# Patient Record
Sex: MALE | Race: WHITE | NOT HISPANIC OR LATINO | Employment: FULL TIME | URBAN - METROPOLITAN AREA
[De-identification: names, ages, dates, MRNs, and addresses within clinical notes are randomized per-mention and may not be internally consistent; named-entity substitution may affect disease eponyms.]

---

## 2021-04-25 ENCOUNTER — HOSPITAL ENCOUNTER (OUTPATIENT)
Facility: HOSPITAL | Age: 41
Setting detail: OBSERVATION
Discharge: HOME | End: 2021-04-26
Attending: EMERGENCY MEDICINE | Admitting: STUDENT IN AN ORGANIZED HEALTH CARE EDUCATION/TRAINING PROGRAM

## 2021-04-25 ENCOUNTER — APPOINTMENT (OUTPATIENT)
Dept: GENERAL RADIOLOGY | Facility: HOSPITAL | Age: 41
End: 2021-04-25

## 2021-04-25 DIAGNOSIS — E10.10 DIABETIC KETOACIDOSIS WITHOUT COMA ASSOCIATED WITH TYPE 1 DIABETES MELLITUS (HCC): Primary | ICD-10-CM

## 2021-04-25 LAB
ALBUMIN SERPL-MCNC: 3.9 G/DL (ref 3.5–5.2)
ALBUMIN SERPL-MCNC: 4.7 G/DL (ref 3.5–5.2)
ALBUMIN/GLOB SERPL: 1.7 G/DL
ALBUMIN/GLOB SERPL: 1.7 G/DL
ALP SERPL-CCNC: 71 U/L (ref 39–117)
ALP SERPL-CCNC: 95 U/L (ref 39–117)
ALT SERPL W P-5'-P-CCNC: 16 U/L (ref 1–41)
ALT SERPL W P-5'-P-CCNC: 22 U/L (ref 1–41)
ANION GAP SERPL CALCULATED.3IONS-SCNC: 14 MMOL/L (ref 5–15)
ANION GAP SERPL CALCULATED.3IONS-SCNC: 17 MMOL/L (ref 5–15)
ANION GAP SERPL CALCULATED.3IONS-SCNC: 25 MMOL/L (ref 5–15)
ANISOCYTOSIS BLD QL: ABNORMAL
AST SERPL-CCNC: 26 U/L (ref 1–40)
AST SERPL-CCNC: 30 U/L (ref 1–40)
ATMOSPHERIC PRESS: ABNORMAL MM[HG]
BACTERIA UR QL AUTO: NORMAL /HPF
BASE EXCESS BLDV CALC-SCNC: -7.3 MMOL/L (ref -2–2)
BASOPHILS # BLD AUTO: 0.1 10*3/MM3 (ref 0–0.2)
BASOPHILS NFR BLD AUTO: 0.7 % (ref 0–1.5)
BDY SITE: ABNORMAL
BILIRUB SERPL-MCNC: 0.4 MG/DL (ref 0–1.2)
BILIRUB SERPL-MCNC: 0.4 MG/DL (ref 0–1.2)
BILIRUB UR QL STRIP: NEGATIVE
BUN SERPL-MCNC: 24 MG/DL (ref 6–20)
BUN SERPL-MCNC: 27 MG/DL (ref 6–20)
BUN SERPL-MCNC: 28 MG/DL (ref 6–20)
BUN/CREAT SERPL: 19.6 (ref 7–25)
BUN/CREAT SERPL: 21.4 (ref 7–25)
BUN/CREAT SERPL: 22.9 (ref 7–25)
CALCIUM SPEC-SCNC: 8.8 MG/DL (ref 8.6–10.5)
CALCIUM SPEC-SCNC: 9.2 MG/DL (ref 8.6–10.5)
CALCIUM SPEC-SCNC: 9.9 MG/DL (ref 8.6–10.5)
CHLORIDE SERPL-SCNC: 101 MMOL/L (ref 98–107)
CHLORIDE SERPL-SCNC: 95 MMOL/L (ref 98–107)
CHLORIDE SERPL-SCNC: 99 MMOL/L (ref 98–107)
CLARITY UR: CLEAR
CO2 BLDA-SCNC: 20.1 MMOL/L (ref 22–29)
CO2 SERPL-SCNC: 17 MMOL/L (ref 22–29)
CO2 SERPL-SCNC: 21 MMOL/L (ref 22–29)
CO2 SERPL-SCNC: 22 MMOL/L (ref 22–29)
COLOR UR: YELLOW
CREAT SERPL-MCNC: 1.12 MG/DL (ref 0.76–1.27)
CREAT SERPL-MCNC: 1.18 MG/DL (ref 0.76–1.27)
CREAT SERPL-MCNC: 1.43 MG/DL (ref 0.76–1.27)
DEPRECATED RDW RBC AUTO: 43.3 FL (ref 37–54)
DEPRECATED RDW RBC AUTO: 44.2 FL (ref 37–54)
EOSINOPHIL # BLD AUTO: 0 10*3/MM3 (ref 0–0.4)
EOSINOPHIL # BLD MANUAL: 0.19 10*3/MM3 (ref 0–0.4)
EOSINOPHIL NFR BLD AUTO: 0.2 % (ref 0.3–6.2)
EOSINOPHIL NFR BLD MANUAL: 1 % (ref 0.3–6.2)
ERYTHROCYTE [DISTWIDTH] IN BLOOD BY AUTOMATED COUNT: 13.2 % (ref 12.3–15.4)
ERYTHROCYTE [DISTWIDTH] IN BLOOD BY AUTOMATED COUNT: 13.4 % (ref 12.3–15.4)
ETHANOL UR QL: 0.02 %
GFR SERPL CREATININE-BSD FRML MDRD: 54 ML/MIN/1.73
GFR SERPL CREATININE-BSD FRML MDRD: 68 ML/MIN/1.73
GFR SERPL CREATININE-BSD FRML MDRD: 72 ML/MIN/1.73
GLOBULIN UR ELPH-MCNC: 2.3 GM/DL
GLOBULIN UR ELPH-MCNC: 2.7 GM/DL
GLUCOSE BLDC GLUCOMTR-MCNC: 151 MG/DL (ref 70–105)
GLUCOSE BLDC GLUCOMTR-MCNC: 158 MG/DL (ref 70–105)
GLUCOSE BLDC GLUCOMTR-MCNC: 163 MG/DL (ref 70–105)
GLUCOSE BLDC GLUCOMTR-MCNC: 270 MG/DL (ref 70–105)
GLUCOSE SERPL-MCNC: 146 MG/DL (ref 65–99)
GLUCOSE SERPL-MCNC: 226 MG/DL (ref 65–99)
GLUCOSE SERPL-MCNC: 290 MG/DL (ref 65–99)
GLUCOSE UR STRIP-MCNC: ABNORMAL MG/DL
HCO3 BLDV-SCNC: 18.9 MMOL/L (ref 22–26)
HCT VFR BLD AUTO: 41.1 % (ref 37.5–51)
HCT VFR BLD AUTO: 47.2 % (ref 37.5–51)
HGB BLD-MCNC: 14.3 G/DL (ref 13–17.7)
HGB BLD-MCNC: 16.1 G/DL (ref 13–17.7)
HGB UR QL STRIP.AUTO: ABNORMAL
HYALINE CASTS UR QL AUTO: NORMAL /LPF
INHALED O2 CONCENTRATION: 21 %
KETONES UR QL STRIP: ABNORMAL
KETONES UR QL STRIP: ABNORMAL
LEUKOCYTE ESTERASE UR QL STRIP.AUTO: NEGATIVE
LIPASE SERPL-CCNC: 17 U/L (ref 13–60)
LYMPHOCYTES # BLD AUTO: 2.3 10*3/MM3 (ref 0.7–3.1)
LYMPHOCYTES # BLD MANUAL: 2.23 10*3/MM3 (ref 0.7–3.1)
LYMPHOCYTES NFR BLD AUTO: 14.5 % (ref 19.6–45.3)
LYMPHOCYTES NFR BLD MANUAL: 12 % (ref 19.6–45.3)
LYMPHOCYTES NFR BLD MANUAL: 8 % (ref 5–12)
MAGNESIUM SERPL-MCNC: 1.6 MG/DL (ref 1.6–2.6)
MAGNESIUM SERPL-MCNC: 1.7 MG/DL (ref 1.6–2.6)
MCH RBC QN AUTO: 32.4 PG (ref 26.6–33)
MCH RBC QN AUTO: 32.9 PG (ref 26.6–33)
MCHC RBC AUTO-ENTMCNC: 34.1 G/DL (ref 31.5–35.7)
MCHC RBC AUTO-ENTMCNC: 34.7 G/DL (ref 31.5–35.7)
MCV RBC AUTO: 94.7 FL (ref 79–97)
MCV RBC AUTO: 95 FL (ref 79–97)
MODALITY: ABNORMAL
MONOCYTES # BLD AUTO: 0.8 10*3/MM3 (ref 0.1–0.9)
MONOCYTES # BLD AUTO: 1.49 10*3/MM3 (ref 0.1–0.9)
MONOCYTES NFR BLD AUTO: 5.1 % (ref 5–12)
NEUTROPHILS # BLD AUTO: 14.69 10*3/MM3 (ref 1.7–7)
NEUTROPHILS NFR BLD AUTO: 12.7 10*3/MM3 (ref 1.7–7)
NEUTROPHILS NFR BLD AUTO: 79.5 % (ref 42.7–76)
NEUTROPHILS NFR BLD MANUAL: 73 % (ref 42.7–76)
NEUTS BAND NFR BLD MANUAL: 6 % (ref 0–5)
NITRITE UR QL STRIP: NEGATIVE
NRBC BLD AUTO-RTO: 0 /100 WBC (ref 0–0.2)
OSMOLALITY SERPL: 296 MOSM/KG (ref 280–300)
OSMOLALITY SERPL: 300 MOSM/KG (ref 280–300)
PCO2 BLDV: 39.9 MM HG (ref 42–51)
PH BLDV: 7.28 PH UNITS (ref 7.32–7.43)
PH UR STRIP.AUTO: <=5 [PH] (ref 5–8)
PHOSPHATE SERPL-MCNC: 2.5 MG/DL (ref 2.5–4.5)
PHOSPHATE SERPL-MCNC: 4.5 MG/DL (ref 2.5–4.5)
PLAT MORPH BLD: NORMAL
PLATELET # BLD AUTO: 188 10*3/MM3 (ref 140–450)
PLATELET # BLD AUTO: 226 10*3/MM3 (ref 140–450)
PMV BLD AUTO: 8.2 FL (ref 6–12)
PMV BLD AUTO: 8.4 FL (ref 6–12)
PO2 BLDV: 32.2 MM HG (ref 40–42)
POTASSIUM SERPL-SCNC: 4.9 MMOL/L (ref 3.5–5.2)
POTASSIUM SERPL-SCNC: 5 MMOL/L (ref 3.5–5.2)
POTASSIUM SERPL-SCNC: 5.1 MMOL/L (ref 3.5–5.2)
PROT SERPL-MCNC: 6.2 G/DL (ref 6–8.5)
PROT SERPL-MCNC: 7.4 G/DL (ref 6–8.5)
PROT UR QL STRIP: NEGATIVE
RBC # BLD AUTO: 4.34 10*6/MM3 (ref 4.14–5.8)
RBC # BLD AUTO: 4.97 10*6/MM3 (ref 4.14–5.8)
RBC # UR: NORMAL /HPF
REF LAB TEST METHOD: NORMAL
SAO2 % BLDCOV: 54.7 % (ref 95–99)
SCAN SLIDE: NORMAL
SODIUM SERPL-SCNC: 137 MMOL/L (ref 136–145)
SP GR UR STRIP: 1.03 (ref 1–1.03)
SQUAMOUS #/AREA URNS HPF: NORMAL /HPF
TROPONIN T SERPL-MCNC: <0.01 NG/ML (ref 0–0.03)
UROBILINOGEN UR QL STRIP: ABNORMAL
WBC # BLD AUTO: 16 10*3/MM3 (ref 3.4–10.8)
WBC # BLD AUTO: 18.6 10*3/MM3 (ref 3.4–10.8)
WBC MORPH BLD: NORMAL
WBC UR QL AUTO: NORMAL /HPF

## 2021-04-25 PROCEDURE — 63710000001 INSULIN REGULAR HUMAN PER 5 UNITS: Performed by: EMERGENCY MEDICINE

## 2021-04-25 PROCEDURE — 80048 BASIC METABOLIC PNL TOTAL CA: CPT | Performed by: EMERGENCY MEDICINE

## 2021-04-25 PROCEDURE — 96375 TX/PRO/DX INJ NEW DRUG ADDON: CPT

## 2021-04-25 PROCEDURE — 84484 ASSAY OF TROPONIN QUANT: CPT | Performed by: HOSPITALIST

## 2021-04-25 PROCEDURE — 85025 COMPLETE CBC W/AUTO DIFF WBC: CPT | Performed by: HOSPITALIST

## 2021-04-25 PROCEDURE — 83735 ASSAY OF MAGNESIUM: CPT | Performed by: EMERGENCY MEDICINE

## 2021-04-25 PROCEDURE — 80053 COMPREHEN METABOLIC PANEL: CPT | Performed by: EMERGENCY MEDICINE

## 2021-04-25 PROCEDURE — 83930 ASSAY OF BLOOD OSMOLALITY: CPT | Performed by: HOSPITALIST

## 2021-04-25 PROCEDURE — 82077 ASSAY SPEC XCP UR&BREATH IA: CPT | Performed by: EMERGENCY MEDICINE

## 2021-04-25 PROCEDURE — 83690 ASSAY OF LIPASE: CPT | Performed by: EMERGENCY MEDICINE

## 2021-04-25 PROCEDURE — 83930 ASSAY OF BLOOD OSMOLALITY: CPT | Performed by: EMERGENCY MEDICINE

## 2021-04-25 PROCEDURE — 25010000002 ONDANSETRON PER 1 MG: Performed by: EMERGENCY MEDICINE

## 2021-04-25 PROCEDURE — 96366 THER/PROPH/DIAG IV INF ADDON: CPT

## 2021-04-25 PROCEDURE — 84100 ASSAY OF PHOSPHORUS: CPT | Performed by: EMERGENCY MEDICINE

## 2021-04-25 PROCEDURE — G0378 HOSPITAL OBSERVATION PER HR: HCPCS

## 2021-04-25 PROCEDURE — 85025 COMPLETE CBC W/AUTO DIFF WBC: CPT | Performed by: EMERGENCY MEDICINE

## 2021-04-25 PROCEDURE — U0004 COV-19 TEST NON-CDC HGH THRU: HCPCS | Performed by: EMERGENCY MEDICINE

## 2021-04-25 PROCEDURE — 71045 X-RAY EXAM CHEST 1 VIEW: CPT

## 2021-04-25 PROCEDURE — 96365 THER/PROPH/DIAG IV INF INIT: CPT

## 2021-04-25 PROCEDURE — 80053 COMPREHEN METABOLIC PANEL: CPT | Performed by: HOSPITALIST

## 2021-04-25 PROCEDURE — 82962 GLUCOSE BLOOD TEST: CPT

## 2021-04-25 PROCEDURE — C9803 HOPD COVID-19 SPEC COLLECT: HCPCS | Performed by: EMERGENCY MEDICINE

## 2021-04-25 PROCEDURE — 63710000001 INSULIN GLARGINE PER 5 UNITS: Performed by: INTERNAL MEDICINE

## 2021-04-25 PROCEDURE — 99284 EMERGENCY DEPT VISIT MOD MDM: CPT

## 2021-04-25 PROCEDURE — 83036 HEMOGLOBIN GLYCOSYLATED A1C: CPT | Performed by: HOSPITALIST

## 2021-04-25 PROCEDURE — 81001 URINALYSIS AUTO W/SCOPE: CPT | Performed by: EMERGENCY MEDICINE

## 2021-04-25 PROCEDURE — 99219 PR INITIAL OBSERVATION CARE/DAY 50 MINUTES: CPT | Performed by: HOSPITALIST

## 2021-04-25 PROCEDURE — 81003 URINALYSIS AUTO W/O SCOPE: CPT | Performed by: INTERNAL MEDICINE

## 2021-04-25 PROCEDURE — 82803 BLOOD GASES ANY COMBINATION: CPT

## 2021-04-25 PROCEDURE — 85007 BL SMEAR W/DIFF WBC COUNT: CPT | Performed by: EMERGENCY MEDICINE

## 2021-04-25 PROCEDURE — 63710000001 INSULIN LISPRO (HUMAN) PER 5 UNITS: Performed by: INTERNAL MEDICINE

## 2021-04-25 RX ORDER — SODIUM CHLORIDE 0.9 % (FLUSH) 0.9 %
3 SYRINGE (ML) INJECTION EVERY 12 HOURS SCHEDULED
Status: DISCONTINUED | OUTPATIENT
Start: 2021-04-25 | End: 2021-04-26 | Stop reason: HOSPADM

## 2021-04-25 RX ORDER — SODIUM CHLORIDE 9 MG/ML
250 INJECTION, SOLUTION INTRAVENOUS CONTINUOUS PRN
Status: DISCONTINUED | OUTPATIENT
Start: 2021-04-25 | End: 2021-04-25

## 2021-04-25 RX ORDER — INSULIN LISPRO 100 [IU]/ML
0-9 INJECTION, SOLUTION INTRAVENOUS; SUBCUTANEOUS AS NEEDED
Status: DISCONTINUED | OUTPATIENT
Start: 2021-04-25 | End: 2021-04-26 | Stop reason: HOSPADM

## 2021-04-25 RX ORDER — DEXTROSE AND SODIUM CHLORIDE 5; .45 G/100ML; G/100ML
150 INJECTION, SOLUTION INTRAVENOUS CONTINUOUS PRN
Status: DISCONTINUED | OUTPATIENT
Start: 2021-04-25 | End: 2021-04-25

## 2021-04-25 RX ORDER — POTASSIUM CHLORIDE, DEXTROSE MONOHYDRATE AND SODIUM CHLORIDE 300; 5; 900 MG/100ML; G/100ML; MG/100ML
150 INJECTION, SOLUTION INTRAVENOUS CONTINUOUS PRN
Status: DISCONTINUED | OUTPATIENT
Start: 2021-04-25 | End: 2021-04-25

## 2021-04-25 RX ORDER — INSULIN LISPRO 100 [IU]/ML
0-9 INJECTION, SOLUTION INTRAVENOUS; SUBCUTANEOUS
Status: DISCONTINUED | OUTPATIENT
Start: 2021-04-25 | End: 2021-04-26 | Stop reason: HOSPADM

## 2021-04-25 RX ORDER — SODIUM CHLORIDE 9 MG/ML
10 INJECTION, SOLUTION INTRAVENOUS CONTINUOUS PRN
Status: DISCONTINUED | OUTPATIENT
Start: 2021-04-25 | End: 2021-04-25

## 2021-04-25 RX ORDER — DEXTROSE AND SODIUM CHLORIDE 5; .9 G/100ML; G/100ML
150 INJECTION, SOLUTION INTRAVENOUS CONTINUOUS PRN
Status: DISCONTINUED | OUTPATIENT
Start: 2021-04-25 | End: 2021-04-26 | Stop reason: HOSPADM

## 2021-04-25 RX ORDER — DEXTROSE, SODIUM CHLORIDE, AND POTASSIUM CHLORIDE 5; .45; .15 G/100ML; G/100ML; G/100ML
150 INJECTION INTRAVENOUS CONTINUOUS PRN
Status: DISCONTINUED | OUTPATIENT
Start: 2021-04-25 | End: 2021-04-26 | Stop reason: HOSPADM

## 2021-04-25 RX ORDER — SODIUM CHLORIDE 0.9 % (FLUSH) 0.9 %
10 SYRINGE (ML) INJECTION ONCE AS NEEDED
Status: DISCONTINUED | OUTPATIENT
Start: 2021-04-25 | End: 2021-04-25

## 2021-04-25 RX ORDER — DEXTROSE AND SODIUM CHLORIDE 5; .45 G/100ML; G/100ML
150 INJECTION, SOLUTION INTRAVENOUS CONTINUOUS PRN
Status: DISCONTINUED | OUTPATIENT
Start: 2021-04-25 | End: 2021-04-26 | Stop reason: HOSPADM

## 2021-04-25 RX ORDER — SODIUM CHLORIDE 450 MG/100ML
250 INJECTION, SOLUTION INTRAVENOUS CONTINUOUS PRN
Status: DISCONTINUED | OUTPATIENT
Start: 2021-04-25 | End: 2021-04-26 | Stop reason: HOSPADM

## 2021-04-25 RX ORDER — SODIUM CHLORIDE 450 MG/100ML
250 INJECTION, SOLUTION INTRAVENOUS CONTINUOUS PRN
Status: DISCONTINUED | OUTPATIENT
Start: 2021-04-25 | End: 2021-04-25

## 2021-04-25 RX ORDER — ONDANSETRON 2 MG/ML
4 INJECTION INTRAMUSCULAR; INTRAVENOUS ONCE
Status: COMPLETED | OUTPATIENT
Start: 2021-04-25 | End: 2021-04-25

## 2021-04-25 RX ORDER — DEXTROSE MONOHYDRATE 25 G/50ML
12.5 INJECTION, SOLUTION INTRAVENOUS AS NEEDED
Status: DISCONTINUED | OUTPATIENT
Start: 2021-04-25 | End: 2021-04-26 | Stop reason: HOSPADM

## 2021-04-25 RX ORDER — SODIUM CHLORIDE 0.9 % (FLUSH) 0.9 %
10 SYRINGE (ML) INJECTION AS NEEDED
Status: DISCONTINUED | OUTPATIENT
Start: 2021-04-25 | End: 2021-04-25

## 2021-04-25 RX ORDER — DEXTROSE, SODIUM CHLORIDE, AND POTASSIUM CHLORIDE 5; .9; .15 G/100ML; G/100ML; G/100ML
150 INJECTION INTRAVENOUS CONTINUOUS PRN
Status: DISCONTINUED | OUTPATIENT
Start: 2021-04-25 | End: 2021-04-26 | Stop reason: HOSPADM

## 2021-04-25 RX ORDER — SODIUM CHLORIDE 9 MG/ML
10 INJECTION, SOLUTION INTRAVENOUS CONTINUOUS PRN
Status: DISCONTINUED | OUTPATIENT
Start: 2021-04-25 | End: 2021-04-26 | Stop reason: HOSPADM

## 2021-04-25 RX ORDER — NICOTINE POLACRILEX 4 MG
15 LOZENGE BUCCAL
Status: DISCONTINUED | OUTPATIENT
Start: 2021-04-25 | End: 2021-04-26 | Stop reason: HOSPADM

## 2021-04-25 RX ORDER — DEXTROSE, SODIUM CHLORIDE, AND POTASSIUM CHLORIDE 5; .45; .3 G/100ML; G/100ML; G/100ML
150 INJECTION INTRAVENOUS CONTINUOUS PRN
Status: DISCONTINUED | OUTPATIENT
Start: 2021-04-25 | End: 2021-04-25

## 2021-04-25 RX ORDER — SODIUM CHLORIDE AND POTASSIUM CHLORIDE 150; 900 MG/100ML; MG/100ML
250 INJECTION, SOLUTION INTRAVENOUS CONTINUOUS PRN
Status: DISCONTINUED | OUTPATIENT
Start: 2021-04-25 | End: 2021-04-26 | Stop reason: HOSPADM

## 2021-04-25 RX ORDER — POTASSIUM CHLORIDE, DEXTROSE MONOHYDRATE AND SODIUM CHLORIDE 300; 5; 900 MG/100ML; G/100ML; MG/100ML
150 INJECTION, SOLUTION INTRAVENOUS CONTINUOUS PRN
Status: DISCONTINUED | OUTPATIENT
Start: 2021-04-25 | End: 2021-04-26 | Stop reason: HOSPADM

## 2021-04-25 RX ORDER — INSULIN GLARGINE 100 [IU]/ML
20 INJECTION, SOLUTION SUBCUTANEOUS NIGHTLY
Status: DISCONTINUED | OUTPATIENT
Start: 2021-04-25 | End: 2021-04-26 | Stop reason: HOSPADM

## 2021-04-25 RX ORDER — SODIUM CHLORIDE AND POTASSIUM CHLORIDE 300; 900 MG/100ML; MG/100ML
250 INJECTION, SOLUTION INTRAVENOUS CONTINUOUS PRN
Status: DISCONTINUED | OUTPATIENT
Start: 2021-04-25 | End: 2021-04-25

## 2021-04-25 RX ORDER — DEXTROSE AND SODIUM CHLORIDE 5; .9 G/100ML; G/100ML
150 INJECTION, SOLUTION INTRAVENOUS CONTINUOUS PRN
Status: DISCONTINUED | OUTPATIENT
Start: 2021-04-25 | End: 2021-04-25

## 2021-04-25 RX ORDER — DEXTROSE, SODIUM CHLORIDE, AND POTASSIUM CHLORIDE 5; .9; .15 G/100ML; G/100ML; G/100ML
150 INJECTION INTRAVENOUS CONTINUOUS PRN
Status: DISCONTINUED | OUTPATIENT
Start: 2021-04-25 | End: 2021-04-25

## 2021-04-25 RX ORDER — DEXTROSE, SODIUM CHLORIDE, AND POTASSIUM CHLORIDE 5; .45; .15 G/100ML; G/100ML; G/100ML
150 INJECTION INTRAVENOUS CONTINUOUS PRN
Status: DISCONTINUED | OUTPATIENT
Start: 2021-04-25 | End: 2021-04-25

## 2021-04-25 RX ORDER — DEXTROSE, SODIUM CHLORIDE, AND POTASSIUM CHLORIDE 5; .45; .3 G/100ML; G/100ML; G/100ML
150 INJECTION INTRAVENOUS CONTINUOUS PRN
Status: DISCONTINUED | OUTPATIENT
Start: 2021-04-25 | End: 2021-04-26 | Stop reason: HOSPADM

## 2021-04-25 RX ORDER — INSULIN LISPRO 100 [IU]/ML
6 INJECTION, SOLUTION INTRAVENOUS; SUBCUTANEOUS
Status: DISCONTINUED | OUTPATIENT
Start: 2021-04-25 | End: 2021-04-26 | Stop reason: HOSPADM

## 2021-04-25 RX ORDER — SODIUM CHLORIDE AND POTASSIUM CHLORIDE 150; 450 MG/100ML; MG/100ML
250 INJECTION, SOLUTION INTRAVENOUS CONTINUOUS PRN
Status: DISCONTINUED | OUTPATIENT
Start: 2021-04-25 | End: 2021-04-25

## 2021-04-25 RX ORDER — SODIUM CHLORIDE 9 MG/ML
250 INJECTION, SOLUTION INTRAVENOUS CONTINUOUS PRN
Status: DISCONTINUED | OUTPATIENT
Start: 2021-04-25 | End: 2021-04-26 | Stop reason: HOSPADM

## 2021-04-25 RX ORDER — SODIUM CHLORIDE AND POTASSIUM CHLORIDE 150; 450 MG/100ML; MG/100ML
250 INJECTION, SOLUTION INTRAVENOUS CONTINUOUS PRN
Status: DISCONTINUED | OUTPATIENT
Start: 2021-04-25 | End: 2021-04-26 | Stop reason: HOSPADM

## 2021-04-25 RX ORDER — DEXTROSE MONOHYDRATE 25 G/50ML
12.5 INJECTION, SOLUTION INTRAVENOUS AS NEEDED
Status: DISCONTINUED | OUTPATIENT
Start: 2021-04-25 | End: 2021-04-25

## 2021-04-25 RX ORDER — SODIUM CHLORIDE 0.9 % (FLUSH) 0.9 %
10 SYRINGE (ML) INJECTION ONCE AS NEEDED
Status: DISCONTINUED | OUTPATIENT
Start: 2021-04-25 | End: 2021-04-26 | Stop reason: HOSPADM

## 2021-04-25 RX ORDER — SODIUM CHLORIDE 0.9 % (FLUSH) 0.9 %
10 SYRINGE (ML) INJECTION AS NEEDED
Status: DISCONTINUED | OUTPATIENT
Start: 2021-04-25 | End: 2021-04-26 | Stop reason: HOSPADM

## 2021-04-25 RX ORDER — DEXTROSE MONOHYDRATE 25 G/50ML
25 INJECTION, SOLUTION INTRAVENOUS
Status: DISCONTINUED | OUTPATIENT
Start: 2021-04-25 | End: 2021-04-26 | Stop reason: HOSPADM

## 2021-04-25 RX ORDER — SODIUM CHLORIDE AND POTASSIUM CHLORIDE 150; 900 MG/100ML; MG/100ML
250 INJECTION, SOLUTION INTRAVENOUS CONTINUOUS PRN
Status: DISCONTINUED | OUTPATIENT
Start: 2021-04-25 | End: 2021-04-25

## 2021-04-25 RX ADMIN — INSULIN GLARGINE 20 UNITS: 100 INJECTION, SOLUTION SUBCUTANEOUS at 19:20

## 2021-04-25 RX ADMIN — SODIUM CHLORIDE, POTASSIUM CHLORIDE, SODIUM LACTATE AND CALCIUM CHLORIDE 1000 ML: 600; 310; 30; 20 INJECTION, SOLUTION INTRAVENOUS at 14:17

## 2021-04-25 RX ADMIN — ONDANSETRON 4 MG: 2 INJECTION INTRAMUSCULAR; INTRAVENOUS at 14:16

## 2021-04-25 RX ADMIN — Medication 3 ML: at 21:39

## 2021-04-25 RX ADMIN — SODIUM CHLORIDE 4 UNITS/HR: 9 INJECTION, SOLUTION INTRAVENOUS at 16:38

## 2021-04-25 RX ADMIN — INSULIN LISPRO 6 UNITS: 100 INJECTION, SOLUTION INTRAVENOUS; SUBCUTANEOUS at 19:16

## 2021-04-25 RX ADMIN — SODIUM CHLORIDE 8 UNITS/HR: 9 INJECTION, SOLUTION INTRAVENOUS at 15:17

## 2021-04-25 RX ADMIN — FAMOTIDINE 20 MG: 10 INJECTION INTRAVENOUS at 14:17

## 2021-04-25 RX ADMIN — SODIUM CHLORIDE 2000 ML: 9 INJECTION, SOLUTION INTRAVENOUS at 15:18

## 2021-04-25 RX ADMIN — DEXTROSE MONOHYDRATE, SODIUM CHLORIDE, AND POTASSIUM CHLORIDE 150 ML/HR: 50; 4.5; 1.49 INJECTION, SOLUTION INTRAVENOUS at 17:17

## 2021-04-25 RX ADMIN — INSULIN LISPRO 6 UNITS: 100 INJECTION, SOLUTION INTRAVENOUS; SUBCUTANEOUS at 20:38

## 2021-04-26 VITALS
HEART RATE: 76 BPM | BODY MASS INDEX: 28.15 KG/M2 | WEIGHT: 190.04 LBS | DIASTOLIC BLOOD PRESSURE: 60 MMHG | TEMPERATURE: 98.2 F | HEIGHT: 69 IN | OXYGEN SATURATION: 97 % | SYSTOLIC BLOOD PRESSURE: 121 MMHG | RESPIRATION RATE: 20 BRPM

## 2021-04-26 LAB
ANION GAP SERPL CALCULATED.3IONS-SCNC: 11 MMOL/L (ref 5–15)
BUN SERPL-MCNC: 23 MG/DL (ref 6–20)
BUN/CREAT SERPL: 23 (ref 7–25)
CALCIUM SPEC-SCNC: 8.9 MG/DL (ref 8.6–10.5)
CHLORIDE SERPL-SCNC: 102 MMOL/L (ref 98–107)
CO2 SERPL-SCNC: 24 MMOL/L (ref 22–29)
CREAT SERPL-MCNC: 1 MG/DL (ref 0.76–1.27)
GFR SERPL CREATININE-BSD FRML MDRD: 82 ML/MIN/1.73
GLUCOSE BLDC GLUCOMTR-MCNC: 231 MG/DL (ref 70–105)
GLUCOSE BLDC GLUCOMTR-MCNC: 305 MG/DL (ref 70–105)
GLUCOSE SERPL-MCNC: 206 MG/DL (ref 65–99)
HBA1C MFR BLD: 9.4 % (ref 3.5–5.6)
MAGNESIUM SERPL-MCNC: 1.8 MG/DL (ref 1.6–2.6)
PHOSPHATE SERPL-MCNC: 2 MG/DL (ref 2.5–4.5)
POTASSIUM SERPL-SCNC: 4.6 MMOL/L (ref 3.5–5.2)
SARS-COV-2 ORF1AB RESP QL NAA+PROBE: NOT DETECTED
SODIUM SERPL-SCNC: 137 MMOL/L (ref 136–145)

## 2021-04-26 PROCEDURE — 63710000001 INSULIN LISPRO (HUMAN) PER 5 UNITS: Performed by: INTERNAL MEDICINE

## 2021-04-26 PROCEDURE — 99244 OFF/OP CNSLTJ NEW/EST MOD 40: CPT | Performed by: INTERNAL MEDICINE

## 2021-04-26 PROCEDURE — 80048 BASIC METABOLIC PNL TOTAL CA: CPT | Performed by: INTERNAL MEDICINE

## 2021-04-26 PROCEDURE — G0108 DIAB MANAGE TRN  PER INDIV: HCPCS

## 2021-04-26 PROCEDURE — 82962 GLUCOSE BLOOD TEST: CPT

## 2021-04-26 PROCEDURE — G0378 HOSPITAL OBSERVATION PER HR: HCPCS

## 2021-04-26 PROCEDURE — 83735 ASSAY OF MAGNESIUM: CPT | Performed by: INTERNAL MEDICINE

## 2021-04-26 PROCEDURE — 99217 PR OBSERVATION CARE DISCHARGE MANAGEMENT: CPT | Performed by: STUDENT IN AN ORGANIZED HEALTH CARE EDUCATION/TRAINING PROGRAM

## 2021-04-26 PROCEDURE — 84100 ASSAY OF PHOSPHORUS: CPT | Performed by: INTERNAL MEDICINE

## 2021-04-26 RX ORDER — CHLORPHENIR/PHENYLEPH/ASPIRIN 2-7.8-325
1 TABLET, EFFERVESCENT ORAL AS NEEDED
Qty: 50 STRIP | Refills: 2 | OUTPATIENT
Start: 2021-04-26

## 2021-04-26 RX ADMIN — INSULIN LISPRO 7 UNITS: 100 INJECTION, SOLUTION INTRAVENOUS; SUBCUTANEOUS at 12:06

## 2021-04-26 RX ADMIN — INSULIN LISPRO 4 UNITS: 100 INJECTION, SOLUTION INTRAVENOUS; SUBCUTANEOUS at 08:17

## 2021-04-26 RX ADMIN — INSULIN LISPRO 6 UNITS: 100 INJECTION, SOLUTION INTRAVENOUS; SUBCUTANEOUS at 12:06

## 2021-04-26 RX ADMIN — Medication 10 ML: at 08:17

## 2021-04-26 RX ADMIN — INSULIN LISPRO 6 UNITS: 100 INJECTION, SOLUTION INTRAVENOUS; SUBCUTANEOUS at 08:16

## 2021-04-27 ENCOUNTER — TELEPHONE (OUTPATIENT)
Dept: DIABETES SERVICES | Facility: HOSPITAL | Age: 41
End: 2021-04-27

## 2021-04-27 NOTE — TELEPHONE ENCOUNTER
Pt and wife wanted to be notified of A1c result since he was discharged before lab was resulted. Called and spoke with wife. Pt at work. Discussed current A1c result of 9.4%. Reviewed healthy A1c range. Wife with no additional questions/concerns.

## 2021-05-25 ENCOUNTER — OFFICE VISIT (OUTPATIENT)
Dept: ENDOCRINOLOGY | Facility: CLINIC | Age: 41
End: 2021-05-25

## 2021-05-25 VITALS
OXYGEN SATURATION: 95 % | HEART RATE: 78 BPM | TEMPERATURE: 97.3 F | BODY MASS INDEX: 27.25 KG/M2 | DIASTOLIC BLOOD PRESSURE: 70 MMHG | SYSTOLIC BLOOD PRESSURE: 105 MMHG | WEIGHT: 184 LBS | HEIGHT: 69 IN

## 2021-05-25 DIAGNOSIS — L84 FOOT CALLUS: ICD-10-CM

## 2021-05-25 DIAGNOSIS — IMO0002 DIABETES MELLITUS TYPE 1, UNCONTROLLED, WITH COMPLICATIONS: Primary | ICD-10-CM

## 2021-05-25 LAB — GLUCOSE BLDC GLUCOMTR-MCNC: 181 MG/DL (ref 70–105)

## 2021-05-25 PROCEDURE — 99214 OFFICE O/P EST MOD 30 MIN: CPT | Performed by: INTERNAL MEDICINE

## 2021-05-25 PROCEDURE — 82962 GLUCOSE BLOOD TEST: CPT | Performed by: INTERNAL MEDICINE

## 2021-05-25 RX ORDER — MAGNESIUM 200 MG
1000 TABLET ORAL DAILY
Qty: 100 EACH | Refills: 4 | Status: SHIPPED | OUTPATIENT
Start: 2021-05-25 | End: 2021-06-24

## 2021-05-25 RX ORDER — PROCHLORPERAZINE 25 MG/1
SUPPOSITORY RECTAL
Qty: 2 EACH | Refills: 6 | Status: SHIPPED | OUTPATIENT
Start: 2021-05-25 | End: 2022-02-01 | Stop reason: SDUPTHER

## 2021-05-25 RX ORDER — PROCHLORPERAZINE 25 MG/1
1 SUPPOSITORY RECTAL DAILY
Qty: 1 EACH | Refills: 0 | Status: SHIPPED | OUTPATIENT
Start: 2021-05-25 | End: 2022-02-01 | Stop reason: SDUPTHER

## 2021-05-25 RX ORDER — ERGOCALCIFEROL (VITAMIN D2) 50 MCG
2000 CAPSULE ORAL DAILY
Qty: 100 CAPSULE | Refills: 4 | Status: SHIPPED | OUTPATIENT
Start: 2021-05-25 | End: 2021-11-29 | Stop reason: SDUPTHER

## 2021-05-25 RX ORDER — PROCHLORPERAZINE 25 MG/1
1 SUPPOSITORY RECTAL DAILY
Qty: 1 EACH | Refills: 1 | Status: SHIPPED | OUTPATIENT
Start: 2021-05-25

## 2021-05-25 NOTE — PROGRESS NOTES
Endocrine Progress Note Outpatient     Patient Care Team:  Provider, No Known as PCP - General    Chief Complaint: Follow-up type 1 diabetes          HPI: 40-year-old male with history of type 1 diabetes for last 15 years was recently hospitalized with DKA due to pump failure, he is now here for follow-up.  He used to follow with Dr. Charly ALMAGUER but would like to switch the care.  He is currently using Medtronic 670 G pump.  His current insulin pump settings are as follows.  Basal rate: Midnight 0.80 units/h, 3 PM 0.45 units/h, 6 PM 1.25 units/h, and 10:30 PM 0.600 units/h.  His insulin carb ratio is 1 unit for each 10 g of carbohydrate.  His insulin correction factor is 1 unit for each 40 mg blood sugar with a target blood sugar of 100-1 30 and active insulin 3 hours.  He uses NovoLog insulin in the pump.  He has done diabetes education in the past and feels like he could use refresher course.    With regards to complication he does not have any diabetes complications per him.    Past Medical History:   Diagnosis Date   • Diabetes mellitus (CMS/[facility])     Type 1 (14 years)   • Diabetes mellitus type I (CMS/[facility])        Social History     Socioeconomic History   • Marital status:      Spouse name: Not on file   • Number of children: Not on file   • Years of education: Not on file   • Highest education level: Not on file   Tobacco Use   • Smoking status: Current Every Day Smoker     Packs/day: 1.00     Types: Cigarettes   • Smokeless tobacco: Never Used   Vaping Use   • Vaping Use: Never used   Substance and Sexual Activity   • Alcohol use: Yes     Alcohol/week: 2.0 standard drinks     Types: 1 Cans of beer, 1 Shots of liquor per week     Comment: Weekends 5-6 beers, tequilla, whiskey   • Drug use: Yes     Types: Marijuana     Comment: Daily   • Sexual activity: Yes     Partners: Female     Birth control/protection: None       History reviewed. No pertinent family history.    No Known Allergies    ROS:    Constitutional:  Denies fatigue, tiredness.    Eyes:  Denies change in visual acuity   HENT:  Denies nasal congestion or sore throat   Respiratory: denies cough, shortness of breath.   Cardiovascular:  denies chest pain, edema   GI:  Denies abdominal pain, nausea, vomiting.   Musculoskeletal:  Denies back pain or joint pain   Integument:  Denies dry skin and rash   Neurologic:  Denies headache, focal weakness or sensory changes   Endocrine:  Denies polyuria or polydipsia   Psychiatric:  Denies depression or anxiety      Vitals:    05/25/21 0926   BP: 105/70   Pulse: 78   Temp: 97.3 °F (36.3 °C)   SpO2: 95%     Body mass index is 27.17 kg/m².     Physical Exam:  GEN: NAD, conversant  EYES: EOMI, PERRL, no conjunctival erythema  NECK: no thyromegaly, full ROM   CV: RRR, no murmurs/rubs/gallops, no peripheral edema  LUNG: CTAB, no wheezes/rales/ronchi  SKIN: no rashes, no acanthosis  MSK: no deformities, full ROM of all extremities  NEURO: no tremors, DTR normal  PSYCH: AOX3, appropriate mood, affect normal  FEET: Has bilateral calluses on the feet.  Good monofilament test intact, good dorsal pedis pulses on both feet.  No ulcers seen at this time.    Results Review:     I reviewed the patient's new clinical results.    Lab Results   Component Value Date    HGBA1C 9.4 (H) 04/25/2021      Lab Results   Component Value Date    GLUCOSE 206 (H) 04/26/2021    BUN 23 (H) 04/26/2021    CREATININE 1.00 04/26/2021    EGFRIFNONA 82 04/26/2021    BCR 23.0 04/26/2021    K 4.6 04/26/2021    CO2 24.0 04/26/2021    CALCIUM 8.9 04/26/2021    ALBUMIN 3.90 04/25/2021    LABIL2 1.7 09/17/2019    AST 26 04/25/2021    ALT 16 04/25/2021    TRIG 106 09/17/2019    LDL 77 09/17/2019    HDL 58 09/17/2019     Lab Results   Component Value Date    TSH 2.250 09/17/2019    FREET4 1.16 09/17/2019         Medication Review: Reviewed.       Current Outpatient Medications:   •  insulin patient supplied pump, Inject  under the skin into the  appropriate area as directed Continuous. Novolog insulin- per patient ratio of 10:1, Disp: , Rfl:       Assessment/Plan   Diabetes mellitus type 1: Uncontrolled, I have reviewed his blood sugar records, they are unpredictable at this time.  At this time we will continue his current insulin pump settings and recommend that he uses Dexcom continuous glucose monitoring system.  I will send a prescription.  Always keep glucose source in case of low blood sugar.  Also will refer him for refresher course for diabetes education.  He will continue NovoLog insulin through the pump at this time.    2.  Bilateral calluses: We will send him for podiatry evaluation.            Skylar Barnes MD FACE.

## 2021-05-25 NOTE — PATIENT INSTRUCTIONS
Stop smoking.  Continue current insulin pump settings  Start Dexcom continuous glucose monitoring system  Arrange for refresher course for diabetes  Arrange for podiatry consultation with Dr. Jiang  Always keep glucose source in case of low blood sugar  Annual eye exam and flu vaccine  Labs before follow-up.

## 2021-05-26 ENCOUNTER — TELEPHONE (OUTPATIENT)
Dept: ENDOCRINOLOGY | Facility: CLINIC | Age: 41
End: 2021-05-26

## 2021-06-07 ENCOUNTER — TELEPHONE (OUTPATIENT)
Dept: ENDOCRINOLOGY | Facility: CLINIC | Age: 41
End: 2021-06-07

## 2021-06-07 NOTE — TELEPHONE ENCOUNTER
Called pt to clarify the CGM for [facility] ppw.  Pt is currently not on a CGM but wants to get the Dexcom.  MD order for pump supplies and CGM referral on MD desk for signature and then will be faxed to [facility].

## 2021-06-24 ENCOUNTER — OFFICE VISIT (OUTPATIENT)
Dept: PODIATRY | Facility: CLINIC | Age: 41
End: 2021-06-24

## 2021-06-24 VITALS
BODY MASS INDEX: 27.25 KG/M2 | HEART RATE: 76 BPM | SYSTOLIC BLOOD PRESSURE: 118 MMHG | WEIGHT: 184 LBS | DIASTOLIC BLOOD PRESSURE: 82 MMHG | HEIGHT: 69 IN

## 2021-06-24 DIAGNOSIS — L74.513 HYPERHIDROSIS OF FEET: ICD-10-CM

## 2021-06-24 DIAGNOSIS — L08.89 PITTED KERATOLYSIS: ICD-10-CM

## 2021-06-24 PROCEDURE — 99203 OFFICE O/P NEW LOW 30 MIN: CPT | Performed by: PODIATRIST

## 2021-06-24 RX ORDER — ACETAMINOPHEN 160 MG
TABLET,DISINTEGRATING ORAL
COMMUNITY
Start: 2021-05-25 | End: 2021-11-29 | Stop reason: SDUPTHER

## 2021-06-25 NOTE — PROGRESS NOTES
06/24/2021  Foot and Ankle Surgery - New Patient   Provider: Dr. Nura Jiang DPM  Location: [facility]    Subjective:  Billy Chaudhari is a 40 y.o. male.     Chief Complaint   Patient presents with   • Left Foot - Callouses   • Right Foot - Callouses   • Annual Exam     DM check       HPI: Patient presents as a routine diabetic foot check.  Patient does have issues involving his feet.  He complains of excessive sweating throughout the day that causes significant maceration and skin slough at the end of the day.  He states that he has had these issues for several years.  He has not seen a podiatrist in the past.  He denies any open wounds or infections to his feet.  He feels that his feet are doing quite well at this time but he has not had a full day of work.  He denies any significant itching but does complain of substantial foul odor.  He has tried over-the-counter antifungal topicals without any significant improvement.  Patient states that his most recent A1c was 8.9%.  No other issues today.    No Known Allergies    Past Medical History:   Diagnosis Date   • Diabetes mellitus (CMS/[facility])     Type 1 (14 years)   • Diabetes mellitus type I (CMS/[facility])        Past Surgical History:   Procedure Laterality Date   • HERNIA REPAIR         History reviewed. No pertinent family history.    Social History     Socioeconomic History   • Marital status:      Spouse name: Not on file   • Number of children: Not on file   • Years of education: Not on file   • Highest education level: Not on file   Tobacco Use   • Smoking status: Current Every Day Smoker     Packs/day: 1.00     Years: 22.00     Pack years: 22.00     Types: Cigarettes   • Smokeless tobacco: Never Used   Vaping Use   • Vaping Use: Never used   Substance and Sexual Activity   • Alcohol use: Yes     Alcohol/week: 2.0 standard drinks     Types: 1 Cans of beer, 1 Shots of liquor per week     Comment: Weekends 5-6 beers, tequilla, whiskey   • Drug  "use: Yes     Types: Marijuana     Comment: Daily   • Sexual activity: Yes     Partners: Female     Birth control/protection: None        Current Outpatient Medications on File Prior to Visit   Medication Sig Dispense Refill   • Continuous Blood Gluc  (Dexcom G6 ) device 1 Device Daily. 1 each 1   • Continuous Blood Gluc Sensor (Dexcom G6 Sensor) Every 10 (Ten) Days. 2 each 6   • Continuous Blood Gluc Transmit (Dexcom G6 Transmitter) misc 1 Device Daily. 1 each 0   • insulin patient supplied pump Inject  under the skin into the appropriate area as directed Continuous. Novolog insulin- per patient ratio of 10:1     • Vitamin D, Ergocalciferol, 50 MCG (2000 UT) capsule Take 2,000 Units by mouth Daily. 100 capsule 4   • Cholecalciferol (Vitamin D3) 50 MCG (2000 UT) capsule        No current facility-administered medications on file prior to visit.       Review of Systems:  General: Denies fever, chills, fatigue, and weakness.  Eyes: Denies vision loss, blurry vision, and excessive redness.  ENT: Denies hearing issues and difficulty swallowing.  Cardiovascular: Denies palpitations, chest pain, or syncopal episodes.  Respiratory: Denies shortness of breath, wheezing, and coughing.  GI: Denies abdominal pain, nausea, and vomiting.   : Denies frequency, hematuria, and urgency.  Musculoskeletal: Denies muscle cramps, joint pains, and stiffness.  Derm: + Excessive sweating   Neuro: Denies headaches, numbness, loss of coordination, and tremors.  Psych: Denies anxiety and depression.  Endocrine: Denies temperature intolerance and changes in appetite.  Heme: Denies bleeding disorders or abnormal bruising.     Objective   /82   Pulse 76   Ht 174 cm (68.5\")   Wt 83.5 kg (184 lb)   BMI 27.57 kg/m²     Foot/Ankle Exam:       General:   Appearance: appears stated age and healthy    Orientation: AAOx3    Affect: appropriate    Gait: unimpaired      VASCULAR      Right Foot Vascularity   Normal vascular exam "    Dorsalis pedis:  2+  Posterior tibial:  2+  Skin Temperature: warm    Edema Grading:  None  CFT:  < 3 seconds  Pedal Hair Growth:  Present  Varicosities: none       Left Foot Vascularity   Normal vascular exam    Dorsalis pedis:  2+  Posterior tibial:  2+  Skin Temperature: warm    Edema Grading:  None  CFT:  < 3 seconds  Pedal Hair Growth:  Present  Varicosities: none        NEUROLOGIC     Right Foot Neurologic   Achilles reflex:  2+     Left Foot Neurologic   Achilles reflex:  2+     MUSCULOSKELETAL      Right Foot Musculoskeletal   Ecchymosis:  None  Tenderness: none    Arch:  Normal     Left Foot Musculoskeletal   Ecchymosis:  None  Tenderness: none    Arch:  Normal     MUSCLE STRENGTH     Right Foot Muscle Strength   Normal strength    Foot dorsiflexion:  5  Foot plantar flexion:  5  Foot inversion:  5  Foot eversion:  5     Left Foot Muscle Strength   Normal strength    Foot dorsiflexion:  5  Foot plantar flexion:  5  Foot inversion:  5  Foot eversion:  5     DERMATOLOGIC     Right Foot Dermatologic   Skin: skin intact       Left Foot Dermatologic   Skin: skin intact       TESTS     Right Foot Tests   Anterior drawer: negative    Varus tilt: negative       Left Foot Tests   Anterior drawer: negative    Varus tilt: negative        Right Foot Additional Comments No open wounds or infections to the feet.  No substantial maceration or other concerning features at this time.      Assessment/Plan   Diagnoses and all orders for this visit:    1. Type 2 diabetes mellitus with hyperglycemia, without long-term current use of insulin (CMS/[facility]) (Primary)    2. Hyperhidrosis of feet    3. Pitted keratolysis      Patient presents for routine diabetic foot check but complains of excessive sweating and follow odor to the feet.  After evaluation, his feet appear appropriate today but he states that he has not been working.  At the end of the day he has his issues.  He has tried over-the-counter topical antifungals without  any significant improvement.  Today, he does not have any redness or irritation concerning for tinea.  I do feel that his issues are likely secondary to hyperhidrosis and pitted keratolysis.  I do not feel that he requires an oral antibiotic.  I have asked that he change his socks twice daily.  I would like him to consider an aerosol antiperspirant to his feet daily.  We did discuss these measures and observation.  He does notice intermittent tingling to his feet which could be secondary to lack of support and overuse.  I have asked that he obtain a pair of over-the-counter arch supports with metatarsal pad for forefoot offloading.  I also explained the possibility of diabetic neuropathy.  He does appear to be neurovascularly intact.  Overall, I do feel that he is at mild risk of pedal complications at this time.  After chart check, his most recent A1c was 9.4%.  We did discuss the importance of daily diabetic foot checks and glycemic control.  He is to return in 4 weeks for reevaluation    No orders of the defined types were placed in this encounter.       Note is dictated utilizing voice recognition software. Unfortunately this leads to occasional typographical errors. I apologize in advance if the situation occurs. If questions occur please do not hesitate to call our office.

## 2021-08-30 ENCOUNTER — OFFICE VISIT (OUTPATIENT)
Dept: ENDOCRINOLOGY | Facility: CLINIC | Age: 41
End: 2021-08-30

## 2021-08-30 DIAGNOSIS — IMO0002 DIABETES MELLITUS TYPE 1, UNCONTROLLED, WITH COMPLICATIONS: ICD-10-CM

## 2021-08-30 PROCEDURE — G0109 DIAB MANAGE TRN IND/GROUP: HCPCS | Performed by: DIETITIAN, REGISTERED

## 2021-08-30 NOTE — PROGRESS NOTES
Pt attended refresher class 1 for 1.5 hours. Pt had to leave class early. Called him after class and he is scheduled for refresher class 2 on 9/22.

## 2021-09-22 ENCOUNTER — OFFICE VISIT (OUTPATIENT)
Dept: ENDOCRINOLOGY | Facility: CLINIC | Age: 41
End: 2021-09-22

## 2021-09-22 DIAGNOSIS — IMO0002 DIABETES MELLITUS TYPE 1, UNCONTROLLED, WITH COMPLICATIONS: ICD-10-CM

## 2021-09-22 PROCEDURE — 97804 MEDICAL NUTRITION GROUP: CPT | Performed by: DIETITIAN, REGISTERED

## 2021-10-27 ENCOUNTER — TELEPHONE (OUTPATIENT)
Dept: ENDOCRINOLOGY | Facility: CLINIC | Age: 41
End: 2021-10-27

## 2021-10-27 NOTE — TELEPHONE ENCOUNTER
Pt called for a refill on Novolog but I not sure what it means when it says Novolog insulin per pat ratio could you please help me with this RX please send to [facility] 90 days supply.

## 2021-11-22 ENCOUNTER — LAB (OUTPATIENT)
Dept: LAB | Facility: HOSPITAL | Age: 41
End: 2021-11-22
Attending: INTERNAL MEDICINE

## 2021-11-22 DIAGNOSIS — IMO0002 DIABETES MELLITUS TYPE 1, UNCONTROLLED, WITH COMPLICATIONS: ICD-10-CM

## 2021-11-22 LAB
ALBUMIN SERPL-MCNC: 4.5 G/DL (ref 3.5–5.2)
ALBUMIN UR-MCNC: 4.1 MG/DL
ALBUMIN/GLOB SERPL: 1.8 G/DL
ALP SERPL-CCNC: 70 U/L (ref 39–117)
ALT SERPL W P-5'-P-CCNC: 17 U/L (ref 1–41)
ANION GAP SERPL CALCULATED.3IONS-SCNC: 7 MMOL/L (ref 5–15)
AST SERPL-CCNC: 19 U/L (ref 1–40)
BILIRUB SERPL-MCNC: 0.4 MG/DL (ref 0–1.2)
BUN SERPL-MCNC: 14 MG/DL (ref 6–20)
BUN/CREAT SERPL: 13.7 (ref 7–25)
CALCIUM SPEC-SCNC: 9.5 MG/DL (ref 8.6–10.5)
CHLORIDE SERPL-SCNC: 101 MMOL/L (ref 98–107)
CHOLEST SERPL-MCNC: 155 MG/DL (ref 0–200)
CO2 SERPL-SCNC: 30 MMOL/L (ref 22–29)
CREAT SERPL-MCNC: 1.02 MG/DL (ref 0.76–1.27)
CREAT UR-MCNC: 211 MG/DL
GFR SERPL CREATININE-BSD FRML MDRD: 80 ML/MIN/1.73
GLOBULIN UR ELPH-MCNC: 2.5 GM/DL
GLUCOSE SERPL-MCNC: 189 MG/DL (ref 65–99)
HBA1C MFR BLD: 8.3 % (ref 3.5–5.6)
HDLC SERPL-MCNC: 66 MG/DL (ref 40–60)
LDLC SERPL CALC-MCNC: 74 MG/DL (ref 0–100)
LDLC/HDLC SERPL: 1.12 {RATIO}
MICROALBUMIN/CREAT UR: 19.4 MG/G
POTASSIUM SERPL-SCNC: 4.7 MMOL/L (ref 3.5–5.2)
PROT SERPL-MCNC: 7 G/DL (ref 6–8.5)
SODIUM SERPL-SCNC: 138 MMOL/L (ref 136–145)
TRIGL SERPL-MCNC: 76 MG/DL (ref 0–150)
VLDLC SERPL-MCNC: 15 MG/DL (ref 5–40)

## 2021-11-22 PROCEDURE — 80061 LIPID PANEL: CPT

## 2021-11-22 PROCEDURE — 83036 HEMOGLOBIN GLYCOSYLATED A1C: CPT

## 2021-11-22 PROCEDURE — 82570 ASSAY OF URINE CREATININE: CPT

## 2021-11-22 PROCEDURE — 36415 COLL VENOUS BLD VENIPUNCTURE: CPT

## 2021-11-22 PROCEDURE — 80053 COMPREHEN METABOLIC PANEL: CPT

## 2021-11-22 PROCEDURE — 82043 UR ALBUMIN QUANTITATIVE: CPT

## 2021-11-29 ENCOUNTER — OFFICE VISIT (OUTPATIENT)
Dept: ENDOCRINOLOGY | Facility: CLINIC | Age: 41
End: 2021-11-29

## 2021-11-29 VITALS
OXYGEN SATURATION: 96 % | HEIGHT: 69 IN | SYSTOLIC BLOOD PRESSURE: 112 MMHG | TEMPERATURE: 97.3 F | HEART RATE: 84 BPM | BODY MASS INDEX: 28.44 KG/M2 | DIASTOLIC BLOOD PRESSURE: 70 MMHG | WEIGHT: 192 LBS

## 2021-11-29 DIAGNOSIS — E10.65 TYPE 1 DIABETES MELLITUS WITH HYPERGLYCEMIA (HCC): Primary | ICD-10-CM

## 2021-11-29 DIAGNOSIS — E55.9 VITAMIN D DEFICIENCY: ICD-10-CM

## 2021-11-29 PROCEDURE — 99214 OFFICE O/P EST MOD 30 MIN: CPT | Performed by: INTERNAL MEDICINE

## 2021-11-29 RX ORDER — ACETAMINOPHEN 160 MG
TABLET,DISINTEGRATING ORAL
Qty: 100 CAPSULE | Refills: 4 | Status: SHIPPED | OUTPATIENT
Start: 2021-11-29 | End: 2022-06-14

## 2021-11-29 NOTE — PROGRESS NOTES
Endocrine Progress Note Outpatient     Patient Care Team:  Provider, No Known as PCP - General    Chief Complaint: Follow-up type 1 diabetes    HPI: 41-year-old male with history of type 1 diabetes for last 15 years is here for follow-up.    He is currently using Medtronic 670 G pump.  His current insulin pump settings are as follows.  Basal rate: Midnight 0.80 units/h, 3 PM 0.45 units/h, 6 PM 1.25 units/h, and 10:30 PM 0.600 units/h.  His insulin carb ratio is 1 unit for each 10 g of carbohydrate.  His insulin correction factor is 1 unit for each 40 mg blood sugar with a target blood sugar of 100-1 30 and active insulin 3 hours.  He uses NovoLog insulin in the pump.  He has done diabetes education in the past.  He also done refresher course.  He is also now using Dexcom continuous glucose monitoring system.  He feels like it has helped him.  Unfortunately were not able to download that today.  He tells me that his blood sugars runs high in the morning between 6-9 AM.  He has not been hospitalized since last seen for either low or high blood sugar and has not required any assistance or hospitalization.    With regards to complication he does not have any diabetes complications per him.    Past Medical History:   Diagnosis Date   • Diabetes mellitus (HCC)     Type 1 (14 years)   • Diabetes mellitus type I (HCC)        Social History     Socioeconomic History   • Marital status:    Tobacco Use   • Smoking status: Current Every Day Smoker     Packs/day: 1.00     Years: 22.00     Pack years: 22.00     Types: Cigarettes   • Smokeless tobacco: Never Used   Vaping Use   • Vaping Use: Never used   Substance and Sexual Activity   • Alcohol use: Yes     Alcohol/week: 2.0 standard drinks     Types: 1 Cans of beer, 1 Shots of liquor per week     Comment: Weekends 5-6 beers, tequilla, whiskey   • Drug use: Yes     Types: Marijuana     Comment: Daily   • Sexual activity: Yes     Partners: Female     Birth  control/protection: None       Family History   Problem Relation Age of Onset   • Diabetes Son         Type 1        No Known Allergies    ROS:   Constitutional:  Denies fatigue, tiredness.    Eyes:  Denies change in visual acuity   HENT:  Denies nasal congestion or sore throat   Respiratory: denies cough, shortness of breath.   Cardiovascular:  denies chest pain, edema   GI:  Denies abdominal pain, nausea, vomiting.   Musculoskeletal:  Denies back pain or joint pain   Integument:  Denies dry skin and rash   Neurologic:  Denies headache, focal weakness or sensory changes   Endocrine:  Denies polyuria or polydipsia   Psychiatric:  Denies depression or anxiety      Vitals:    11/29/21 1545   BP: 112/70   Pulse: 84   Temp: 97.3 °F (36.3 °C)   SpO2: 96%     Body mass index is 28.77 kg/m².     Physical Exam:  GEN: NAD, conversant  EYES: EOMI, PERRL, no conjunctival erythema  NECK: no thyromegaly, full ROM   CV: RRR, no murmurs/rubs/gallops, no peripheral edema  LUNG: CTAB, no wheezes/rales/ronchi  SKIN: no rashes, no acanthosis  MSK: no deformities, full ROM of all extremities  NEURO: no tremors, DTR normal  PSYCH: AOX3, appropriate mood, affect normal  FEET: Has bilateral calluses on the feet.  Good monofilament test intact, good dorsal pedis pulses on both feet.  No ulcers seen at this time.    Results Review:     I reviewed the patient's new clinical results.    Lab Results   Component Value Date    HGBA1C 8.3 (H) 11/22/2021    HGBA1C 9.4 (H) 04/25/2021      Lab Results   Component Value Date    GLUCOSE 189 (H) 11/22/2021    BUN 14 11/22/2021    CREATININE 1.02 11/22/2021    EGFRIFNONA 80 11/22/2021    BCR 13.7 11/22/2021    K 4.7 11/22/2021    CO2 30.0 (H) 11/22/2021    CALCIUM 9.5 11/22/2021    ALBUMIN 4.50 11/22/2021    LABIL2 1.7 09/17/2019    AST 19 11/22/2021    ALT 17 11/22/2021    CHOL 155 11/22/2021    TRIG 76 11/22/2021    LDL 74 11/22/2021    HDL 66 (H) 11/22/2021     Lab Results   Component Value Date     TSH 2.250 09/17/2019    FREET4 1.16 09/17/2019         Medication Review: Reviewed.       Current Outpatient Medications:   •  Cholecalciferol (Vitamin D3) 50 MCG (2000 UT) capsule, , Disp: , Rfl:   •  Continuous Blood Gluc  (Dexcom G6 ) device, 1 Device Daily., Disp: 1 each, Rfl: 1  •  Continuous Blood Gluc Sensor (Dexcom G6 Sensor), Every 10 (Ten) Days., Disp: 2 each, Rfl: 6  •  Continuous Blood Gluc Transmit (Dexcom G6 Transmitter) misc, 1 Device Daily., Disp: 1 each, Rfl: 0  •  insulin aspart (novoLOG) 100 UNIT/ML injection, Use in insulin pump as directed. MDD 50 units, Disp: 50 mL, Rfl: 1  •  insulin patient supplied pump, Inject  under the skin into the appropriate area as directed Continuous. Novolog insulin- per patient ratio of 10:1, Disp: , Rfl:   •  lidocaine (XYLOCAINE) 5 % ointment, , Disp: , Rfl:       Assessment/Plan   Diabetes mellitus type 1: Uncontrolled, but improving with A1c now at 8.3%.  Unfortunately no blood sugar records for review available at this time.  He is going to find his Dexcom log and will share the data with his and then will make further recommendations.  For now we will continue his current insulin pump settings.  He is also due for pump upgrade, I will refer him for pump upgrade to tandem T slim insulin pump.  Always keep glucose source in case of low blood sugar.  Also will refer him for refresher course for diabetes education.  He will continue NovoLog insulin through the pump at this time.    2.  Bilateral calluses: He did follow with Podiatry.    3.  Vitamin D deficiency: On vitamin D supplementation.            Skylar Barnes MD FACE.

## 2021-11-29 NOTE — PATIENT INSTRUCTIONS
Please stop smoking  Please resume vitamin D at 2000 units p.o. daily  Please arrange appointment with diabetes educators for pump upgrade  Please log into your Dexcom account and share your data with us  Always keep glucose source in case of low blood sugar  Annual eye exam and flu vaccine  Labs before follow-up.

## 2021-12-01 ENCOUNTER — TELEPHONE (OUTPATIENT)
Dept: ENDOCRINOLOGY | Facility: CLINIC | Age: 41
End: 2021-12-01

## 2021-12-01 NOTE — TELEPHONE ENCOUNTER
Called pt about pump upgrade.  He states his pump is OOW and wants to get the tslim.  Emailed AOB to pt to fill out, sign and fax back.    Once receiving it back will fax to [employer] with last OV, demo sheet and insurance cards.

## 2021-12-03 ENCOUNTER — TELEPHONE (OUTPATIENT)
Dept: ENDOCRINOLOGY | Facility: CLINIC | Age: 41
End: 2021-12-03

## 2021-12-06 ENCOUNTER — TELEPHONE (OUTPATIENT)
Dept: ENDOCRINOLOGY | Facility: CLINIC | Age: 41
End: 2021-12-06

## 2021-12-12 ENCOUNTER — HOSPITAL ENCOUNTER (EMERGENCY)
Facility: HOSPITAL | Age: 41
Discharge: HOME | End: 2021-12-12
Admitting: EMERGENCY MEDICINE

## 2021-12-12 VITALS
SYSTOLIC BLOOD PRESSURE: 135 MMHG | TEMPERATURE: 98 F | HEIGHT: 68 IN | WEIGHT: 185.19 LBS | BODY MASS INDEX: 28.07 KG/M2 | RESPIRATION RATE: 16 BRPM | DIASTOLIC BLOOD PRESSURE: 80 MMHG | OXYGEN SATURATION: 100 % | HEART RATE: 76 BPM

## 2021-12-12 DIAGNOSIS — S16.1XXA STRAIN OF NECK MUSCLE, INITIAL ENCOUNTER: Primary | ICD-10-CM

## 2021-12-12 DIAGNOSIS — M25.511 ACUTE PAIN OF RIGHT SHOULDER: ICD-10-CM

## 2021-12-12 PROCEDURE — 99282 EMERGENCY DEPT VISIT SF MDM: CPT

## 2021-12-12 PROCEDURE — 25010000002 METHYLPREDNISOLONE PER 125 MG: Performed by: PHYSICIAN ASSISTANT

## 2021-12-12 PROCEDURE — 25010000002 KETOROLAC TROMETHAMINE PER 15 MG: Performed by: PHYSICIAN ASSISTANT

## 2021-12-12 PROCEDURE — 96372 THER/PROPH/DIAG INJ SC/IM: CPT

## 2021-12-12 RX ORDER — METHYLPREDNISOLONE SODIUM SUCCINATE 125 MG/2ML
125 INJECTION, POWDER, LYOPHILIZED, FOR SOLUTION INTRAMUSCULAR; INTRAVENOUS ONCE
Status: COMPLETED | OUTPATIENT
Start: 2021-12-12 | End: 2021-12-12

## 2021-12-12 RX ORDER — LIDOCAINE 50 MG/G
1 PATCH TOPICAL EVERY 24 HOURS
Qty: 20 PATCH | Refills: 0 | Status: SHIPPED | OUTPATIENT
Start: 2021-12-12 | End: 2022-06-14

## 2021-12-12 RX ORDER — METHYLPREDNISOLONE 4 MG/1
TABLET ORAL
Qty: 21 TABLET | Refills: 0 | Status: SHIPPED | OUTPATIENT
Start: 2021-12-12 | End: 2022-06-14

## 2021-12-12 RX ORDER — KETOROLAC TROMETHAMINE 30 MG/ML
30 INJECTION, SOLUTION INTRAMUSCULAR; INTRAVENOUS ONCE
Status: COMPLETED | OUTPATIENT
Start: 2021-12-12 | End: 2021-12-12

## 2021-12-12 RX ORDER — METHOCARBAMOL 500 MG/1
500 TABLET, FILM COATED ORAL 4 TIMES DAILY
Qty: 30 TABLET | Refills: 0 | Status: SHIPPED | OUTPATIENT
Start: 2021-12-12 | End: 2022-06-14

## 2021-12-12 RX ORDER — DICLOFENAC SODIUM 75 MG/1
75 TABLET, DELAYED RELEASE ORAL 2 TIMES DAILY
Qty: 60 TABLET | Refills: 0 | Status: SHIPPED | OUTPATIENT
Start: 2021-12-12 | End: 2022-06-14

## 2021-12-12 RX ADMIN — KETOROLAC TROMETHAMINE 30 MG: 30 INJECTION, SOLUTION INTRAMUSCULAR at 10:17

## 2021-12-12 RX ADMIN — METHYLPREDNISOLONE SODIUM SUCCINATE 125 MG: 125 INJECTION, POWDER, FOR SOLUTION INTRAMUSCULAR; INTRAVENOUS at 10:18

## 2021-12-12 NOTE — DISCHARGE INSTRUCTIONS
"** ANY CONDITION CAN CHANGE, despite proper treatment.    ** IF YOUR SYMPTOMS WORSEN, CHANGE, or PERSIST,   then get to the nearest Emergency Department (ER), call your PCP, or return to Urgent Care.    ** FOLLOW-UP CARE IS YOUR RESPONSIBILITY.    Discharge from Urgent Care today does NOT mean that nothing is wrong, but it indicates no emergency is identified.  Urgent Care is NOT a substitute for your primary care provider/physician (PCP), additional evaluation may be needed.     ** ALWAYS FOLLOW-UP WITH YOUR OWN PHYSICIAN / PRIMARY CARE PROVIDER (PCP)  to review this Urgent Care visit, and for review and possible repeat blood or urine tests, x-rays, or other diagnostics.      ** COMPLETE ALL TESTS & SCHEDULE ALL REFERRALS ordered or recommended by Urgent Care.  If you need help making an appointment within the recommended time frame, then please call us for help!  For assistance arranging a PCP appointment, call [phone] option 2.  [facility] may call to   assist with some referrals.    ** CONTACT URGENT CARE for ALL TEST, X-RAY, and other RESULTS within 3 DAYS.    Please do NOT assume that \"no news is good news.\"    ** ALL MEDICATIONS CAN HAVE SIDE EFFECTS & INTERACTIONS.  Please review these, and ask your pharmacist or contact your primary care provider for questions or concerns.   Be sure that UC, your pharmacist, and your PCP have a complete list of all medications and supplements you're taking.      "

## 2021-12-12 NOTE — ED PROVIDER NOTES
Subjective   History:  Patient is a 41-year-old male who presents with right-sided neck and shoulder pain.  He reports several years ago he had an injury and since then he has flareups.  He reports it happened on Wednesday morning started having muscle spasms that has come down but he reports he still has sharp pain on the right side of his shoulder and neck.  Has been taking ibuprofen without significant change or relief.    Onset: 4 days  Location: Right side of neck and shoulder  Duration: Constant  Character: Sharp pain  Aggravating/Alleviating factors: None  Radiation right arm moderate  Severity:             Review of Systems   Constitutional: Negative for chills, diaphoresis, fatigue and fever.   Respiratory: Negative for cough, choking, shortness of breath and stridor.    Gastrointestinal: Negative for abdominal distention, abdominal pain, nausea and vomiting.   Genitourinary: Negative.    Musculoskeletal: Positive for neck pain.   Skin: Negative.    Neurological: Negative.    Psychiatric/Behavioral: Negative.        Past Medical History:   Diagnosis Date   • Diabetes mellitus (HCC)     Type 1 (14 years)   • Diabetes mellitus type I (HCC)        No Known Allergies    Past Surgical History:   Procedure Laterality Date   • HERNIA REPAIR         Family History   Problem Relation Age of Onset   • Diabetes Son         Type 1        Social History     Socioeconomic History   • Marital status:    Tobacco Use   • Smoking status: Current Every Day Smoker     Packs/day: 1.00     Years: 22.00     Pack years: 22.00     Types: Cigarettes   • Smokeless tobacco: Never Used   Vaping Use   • Vaping Use: Never used   Substance and Sexual Activity   • Alcohol use: Yes     Alcohol/week: 2.0 standard drinks     Types: 1 Cans of beer, 1 Shots of liquor per week     Comment: Weekends 5-6 beers, tequilla, whiskey   • Drug use: Yes     Types: Marijuana     Comment: Daily   • Sexual activity: Yes     Partners: Female     Birth  control/protection: None           Objective   Physical Exam  Vitals and nursing note reviewed.   Constitutional:       Appearance: He is well-developed.   HENT:      Head: Normocephalic and atraumatic.      Nose: Nose normal.   Eyes:      Pupils: Pupils are equal, round, and reactive to light.   Pulmonary:      Effort: Pulmonary effort is normal.   Musculoskeletal:         General: Normal range of motion.      Cervical back: Normal range of motion.      Comments: No pain with palpation over cervical spinal musculature.  Full ROM of neck intact.  Full ROM of right shoulder.  No loss in strength in upper extremity.  No loss of sensation bilaterally in upper extremity   Skin:     General: Skin is warm and dry.   Neurological:      General: No focal deficit present.      Mental Status: He is alert and oriented to person, place, and time.   Psychiatric:         Mood and Affect: Mood normal.         Behavior: Behavior normal.         Thought Content: Thought content normal.         Judgment: Judgment normal.         Procedures           ED Course                                                 MDM  Number of Diagnoses or Management Options  Acute pain of right shoulder  Strain of neck muscle, initial encounter  Diagnosis management comments: I examined the patient using the appropriate personal protective equipment.      DISPOSITION:   Chart Review:  Comorbidity:  has a past medical history of Diabetes mellitus (HCC) and Diabetes mellitus type I (HCC).  Differentials:this list is not all inclusive and does not constitute the entirety of considered causes --> cervical strain, herniated disc    Imaging: Was interpreted by physician and reviewed by myself:  No radiology results for the last day    Disposition/Treatment:    41-year-old male who presents with right-sided neck and shoulder pain he was given Toradol and Solu-Medrol sent home with diclofenac Medrol Dosepak lidocaine patches and Robaxin told to follow-up with his  primary care provider for possible physical therapy return precaution follow-up instruction provided this is acute on chronic illness.  He reports has had this for several years and it flares up once a year.  Return precaution follow-up instructions provided he was stable at time of discharge and agreement with plan    Patient Progress  Patient progress: stable      Final diagnoses:   Strain of neck muscle, initial encounter   Acute pain of right shoulder       ED Disposition  ED Disposition     ED Disposition Condition Comment    Discharge Stable           PATIENT CONNECTION - [facility] [id number]  [phone]  Call   To establish with a primary care provider         Medication List      New Prescriptions    diclofenac 75 MG EC tablet  Commonly known as: VOLTAREN  Take 1 tablet by mouth 2 (Two) Times a Day.     methocarbamol 500 MG tablet  Commonly known as: ROBAXIN  Take 1 tablet by mouth 4 (Four) Times a Day.     methylPREDNISolone 4 MG dose pack  Commonly known as: MEDROL  Take as directed on package instructions.        Changed    * lidocaine 5 % ointment  Commonly known as: XYLOCAINE  What changed: Another medication with the same name was added. Make sure you understand how and when to take each.     * lidocaine 5 %  Commonly known as: LIDODERM  Place 1 patch on the skin as directed by provider Daily. Remove & Discard patch within 12 hours or as directed by MD  What changed: You were already taking a medication with the same name, and this prescription was added. Make sure you understand how and when to take each.         * This list has 2 medication(s) that are the same as other medications prescribed for you. Read the directions carefully, and ask your doctor or other care provider to review them with you.               Where to Get Your Medications      [id number] - [place], [place] - [address] -  [phone] [facility] [phone] FX  [address] [place] [address]    Phone: [phone]   · diclofenac 75 MG EC tablet  · lidocaine 5 %  · methocarbamol 500 MG tablet  · methylPREDNISolone 4 MG dose pack          Ines Nguyen PA-C  12/12/21 9516

## 2022-01-07 ENCOUNTER — TELEPHONE (OUTPATIENT)
Dept: ENDOCRINOLOGY | Facility: CLINIC | Age: 42
End: 2022-01-07

## 2022-01-07 NOTE — TELEPHONE ENCOUNTER
He is having feet pain, it hurts when he walks. Burning sensation. Asked if he could come in and see  you to address this issue. Please advise.

## 2022-02-01 ENCOUNTER — TELEPHONE (OUTPATIENT)
Dept: ENDOCRINOLOGY | Facility: CLINIC | Age: 42
End: 2022-02-01

## 2022-02-01 DIAGNOSIS — E10.65 UNCONTROLLED TYPE 1 DIABETES MELLITUS WITH HYPERGLYCEMIA: Primary | ICD-10-CM

## 2022-02-01 RX ORDER — PROCHLORPERAZINE 25 MG/1
SUPPOSITORY RECTAL
Qty: 2 EACH | Refills: 6 | Status: SHIPPED | OUTPATIENT
Start: 2022-02-01 | End: 2022-02-22 | Stop reason: SDUPTHER

## 2022-02-01 RX ORDER — PROCHLORPERAZINE 25 MG/1
1 SUPPOSITORY RECTAL DAILY
Qty: 1 EACH | Refills: 2 | Status: SHIPPED | OUTPATIENT
Start: 2022-02-01 | End: 2022-02-22 | Stop reason: SDUPTHER

## 2022-02-01 NOTE — TELEPHONE ENCOUNTER
Pt requested Dexcom supplies be filled at [facility] mail order pharmacy. Also sent pt invite to [employer] via Dexcom [employer].

## 2022-02-01 NOTE — TELEPHONE ENCOUNTER
PA on Dexcom initiated in covermymeds    Transmitter: PA Case ID: [id number]    Sensor: PA Case ID: [id number]

## 2022-02-09 NOTE — TELEPHONE ENCOUNTER
Deniedon February 2  The benefit provides coverage for the requested drug when medically necessary. However, the information submitted doesnt meet [facility] medical necessity guidelines for coverage. The member must meet the following criteria: has documentation showing a pattern of recurrent (at least two events within a 30 day period), severe hypoglycemic events (i.e. blood sugar less than 70mg/dL) despite appropriate modifications in insulin therapy and member compliance (Note: documentation must include blood sugar logs for at least a 30 day period) OR has a documented history of hypoglycemic unawareness (Note: documentation must include blood sugar logs for at least a 30 day period). This determination was based on the [facility] [employer] Continuous Glucose Monitoring Systems Coverage Policy.

## 2022-02-16 ENCOUNTER — TELEPHONE (OUTPATIENT)
Dept: ENDOCRINOLOGY | Facility: CLINIC | Age: 42
End: 2022-02-16

## 2022-02-16 NOTE — TELEPHONE ENCOUNTER
[facility]  ppw on MD desk for signature and then will be faxed.  My cheat sheet for [facility] does not say they can take [employer].

## 2022-02-16 NOTE — TELEPHONE ENCOUNTER
PA on his Dexcom was denied. Any ideas on how we can get this approved?  Can we send it to [facility] to work on?

## 2022-02-22 ENCOUNTER — TELEPHONE (OUTPATIENT)
Dept: ENDOCRINOLOGY | Facility: CLINIC | Age: 42
End: 2022-02-22

## 2022-02-22 DIAGNOSIS — E10.65 UNCONTROLLED TYPE 1 DIABETES MELLITUS WITH HYPERGLYCEMIA: ICD-10-CM

## 2022-02-22 RX ORDER — PROCHLORPERAZINE 25 MG/1
SUPPOSITORY RECTAL
Qty: 9 EACH | Refills: 3 | Status: SHIPPED | OUTPATIENT
Start: 2022-02-22

## 2022-02-22 RX ORDER — PROCHLORPERAZINE 25 MG/1
1 SUPPOSITORY RECTAL CONTINUOUS
Qty: 1 EACH | Refills: 3 | Status: SHIPPED | OUTPATIENT
Start: 2022-02-22

## 2022-02-22 NOTE — TELEPHONE ENCOUNTER
Shonna messaged that they are out of network with pt's [employer]. Initiated orders via [facility] for pt's Dexcom supplies and notified pt and [facility]  sent

## 2022-03-01 ENCOUNTER — TELEPHONE (OUTPATIENT)
Dept: ENDOCRINOLOGY | Facility: CLINIC | Age: 42
End: 2022-03-01

## 2022-03-01 NOTE — TELEPHONE ENCOUNTER
[facility] is wanting to try to fill pt's CGM.  Called pt if he has received Dexcom supplies through [employer] pharmacy.  He did not know.  Gave pt [employer] pharmacy phone number to call.  If they don't fill via pharmacy, will try [facility] or [facility].

## 2022-03-21 ENCOUNTER — TELEPHONE (OUTPATIENT)
Dept: ENDOCRINOLOGY | Facility: CLINIC | Age: 42
End: 2022-03-21

## 2022-03-21 NOTE — TELEPHONE ENCOUNTER
Pt unable to fill his Dexcom at pharmacy.  [facility] working on his supplies but needs CMN.  Left Ines with [facility] VM with our fax number

## 2022-06-07 ENCOUNTER — LAB (OUTPATIENT)
Dept: LAB | Facility: HOSPITAL | Age: 42
End: 2022-06-07

## 2022-06-07 DIAGNOSIS — E10.65 TYPE 1 DIABETES MELLITUS WITH HYPERGLYCEMIA: ICD-10-CM

## 2022-06-07 LAB
ALBUMIN SERPL-MCNC: 4.8 G/DL (ref 3.5–5.2)
ALBUMIN UR-MCNC: 1.7 MG/DL
ALBUMIN/GLOB SERPL: 2.1 G/DL
ALP SERPL-CCNC: 73 U/L (ref 39–117)
ALT SERPL W P-5'-P-CCNC: 17 U/L (ref 1–41)
ANION GAP SERPL CALCULATED.3IONS-SCNC: 13 MMOL/L (ref 5–15)
AST SERPL-CCNC: 24 U/L (ref 1–40)
BILIRUB SERPL-MCNC: 0.3 MG/DL (ref 0–1.2)
BUN SERPL-MCNC: 12 MG/DL (ref 6–20)
BUN/CREAT SERPL: 13.2 (ref 7–25)
CALCIUM SPEC-SCNC: 9.5 MG/DL (ref 8.6–10.5)
CHLORIDE SERPL-SCNC: 104 MMOL/L (ref 98–107)
CHOLEST SERPL-MCNC: 173 MG/DL (ref 0–200)
CO2 SERPL-SCNC: 25 MMOL/L (ref 22–29)
CREAT SERPL-MCNC: 0.91 MG/DL (ref 0.76–1.27)
CREAT UR-MCNC: 117.8 MG/DL
EGFRCR SERPLBLD CKD-EPI 2021: 107.2 ML/MIN/1.73
GLOBULIN UR ELPH-MCNC: 2.3 GM/DL
GLUCOSE SERPL-MCNC: 85 MG/DL (ref 65–99)
HBA1C MFR BLD: 7 % (ref 3.5–5.6)
HDLC SERPL-MCNC: 61 MG/DL (ref 40–60)
LDLC SERPL CALC-MCNC: 92 MG/DL (ref 0–100)
LDLC/HDLC SERPL: 1.47 {RATIO}
MICROALBUMIN/CREAT UR: 14.4 MG/G
POTASSIUM SERPL-SCNC: 4.7 MMOL/L (ref 3.5–5.2)
PROT SERPL-MCNC: 7.1 G/DL (ref 6–8.5)
SODIUM SERPL-SCNC: 142 MMOL/L (ref 136–145)
TRIGL SERPL-MCNC: 111 MG/DL (ref 0–150)
VLDLC SERPL-MCNC: 20 MG/DL (ref 5–40)

## 2022-06-07 PROCEDURE — 80053 COMPREHEN METABOLIC PANEL: CPT

## 2022-06-07 PROCEDURE — 80061 LIPID PANEL: CPT

## 2022-06-07 PROCEDURE — 82043 UR ALBUMIN QUANTITATIVE: CPT

## 2022-06-07 PROCEDURE — 82570 ASSAY OF URINE CREATININE: CPT

## 2022-06-07 PROCEDURE — 83036 HEMOGLOBIN GLYCOSYLATED A1C: CPT

## 2022-06-07 PROCEDURE — 36415 COLL VENOUS BLD VENIPUNCTURE: CPT

## 2022-06-14 ENCOUNTER — TELEMEDICINE (OUTPATIENT)
Dept: ENDOCRINOLOGY | Facility: CLINIC | Age: 42
End: 2022-06-14

## 2022-06-14 VITALS — BODY MASS INDEX: 28.04 KG/M2 | HEIGHT: 68 IN | WEIGHT: 185 LBS

## 2022-06-14 DIAGNOSIS — E55.9 VITAMIN D DEFICIENCY: ICD-10-CM

## 2022-06-14 DIAGNOSIS — E10.65 TYPE 1 DIABETES MELLITUS WITH HYPERGLYCEMIA: Primary | ICD-10-CM

## 2022-06-14 PROCEDURE — 99214 OFFICE O/P EST MOD 30 MIN: CPT | Performed by: INTERNAL MEDICINE

## 2022-06-14 RX ORDER — DIAPER,BRIEF,INFANT-TODD,DISP
EACH MISCELLANEOUS
Qty: 30 G | Refills: 1 | Status: SHIPPED | OUTPATIENT
Start: 2022-06-14 | End: 2023-06-14

## 2022-06-14 NOTE — PATIENT INSTRUCTIONS
Continue current insulin pump settings.  Always keep glucose source in case of low blood sugar.  Annual eye exam and flu vaccine  Labs before follow-up.   160

## 2022-06-14 NOTE — PROGRESS NOTES
Endocrine Progress Note Outpatient     Patient Care Team:  Provider, No Known as PCP - General  You have chosen to receive care through a telehealth visit.  Do you consent to use a video/audio connection for your medical care today? Yes    Chief Complaint: Follow-up type 1 diabetes    HPI: 41-year-old male with history of type 1 diabetes for last 15 years is followed through telehealth.    He is currently using Tandem insulin pump with Dexcom since last 2/2022. He likes this system, now using Control IQ. His current insulin pump settings are as follows.  Basal rate: Midnight 0.85 units/h, 12 PM 0.80 units/h. His insulin carb ratio is 1 unit for each 10 g of carbohydrate.  His insulin correction factor is 1 unit for each 30 mg blood sugar with a target blood sugar of 100-1 30 and active insulin 3 hours.  He uses NovoLog insulin in the pump.      He likes his new pump and Dexcom monitoring system.  Not having any significant issues with low blood sugars.  Fair control with A1c at 7%.  At this time we have decided to continue his current insulin pump regimen.    He has not been hospitalized since last seen for either low or high blood sugar and has not required any assistance or hospitalization.    With regards to complication he does not have any diabetes complications per him.    Past Medical History:   Diagnosis Date   • Diabetes mellitus (HCC)     Type 1 (14 years)   • Diabetes mellitus type I (HCC)        Social History     Socioeconomic History   • Marital status:      Spouse name: Rosario   • Number of children: 3   • Years of education: 12   Tobacco Use   • Smoking status: Current Every Day Smoker     Packs/day: 1.00     Years: 22.00     Pack years: 22.00     Types: Cigarettes   • Smokeless tobacco: Never Used   Vaping Use   • Vaping Use: Never used   Substance and Sexual Activity   • Alcohol use: Yes     Alcohol/week: 2.0 standard drinks     Types: 1 Cans of beer, 1 Shots of liquor per week     Comment:  Weekends 5-6 beers, tequilla, whiskey   • Drug use: Yes     Types: Marijuana     Comment: Daily   • Sexual activity: Yes     Partners: Female     Birth control/protection: None       Family History   Problem Relation Age of Onset   • Diabetes Son         Type 1        No Known Allergies    ROS:   Constitutional:  Denies fatigue, tiredness.    Eyes:  Denies change in visual acuity   HENT:  Denies nasal congestion or sore throat   Respiratory: denies cough, shortness of breath.   Cardiovascular:  denies chest pain, edema   GI:  Denies abdominal pain, nausea, vomiting.   Musculoskeletal:  Denies back pain or joint pain   Integument:  Denies dry skin and rash   Neurologic:  Denies headache, focal weakness or sensory changes   Endocrine:  Denies polyuria or polydipsia   Psychiatric:  Denies depression or anxiety      There were no vitals filed for this visit.  Body mass index is 28.13 kg/m².     Physical Exam:  GEN: NAD, conversant  PSYCH: AOX3, appropriate mood, affect normal    Results Review:     I reviewed the patient's new clinical results.    Lab Results   Component Value Date    HGBA1C 7.0 (H) 06/07/2022    HGBA1C 8.3 (H) 11/22/2021    HGBA1C 9.4 (H) 04/25/2021      Lab Results   Component Value Date    GLUCOSE 85 06/07/2022    BUN 12 06/07/2022    CREATININE 0.91 06/07/2022    EGFRIFNONA 80 11/22/2021    BCR 13.2 06/07/2022    K 4.7 06/07/2022    CO2 25.0 06/07/2022    CALCIUM 9.5 06/07/2022    ALBUMIN 4.80 06/07/2022    LABIL2 1.7 09/17/2019    AST 24 06/07/2022    ALT 17 06/07/2022    CHOL 173 06/07/2022    TRIG 111 06/07/2022    LDL 92 06/07/2022    HDL 61 (H) 06/07/2022     Lab Results   Component Value Date    TSH 2.250 09/17/2019    FREET4 1.16 09/17/2019         Medication Review: Reviewed.       Current Outpatient Medications:   •  Continuous Blood Gluc  (Dexcom G6 ) device, 1 Device Daily., Disp: 1 each, Rfl: 1  •  Continuous Blood Gluc Sensor (Dexcom G6 Sensor), Pt to use to continuously  monitor his blood sugars.  Pt to change sensor every 10 days, Disp: 9 each, Rfl: 3  •  Continuous Blood Gluc Transmit (Dexcom G6 Transmitter) misc, Inject 1 Device under the skin into the appropriate area as directed Continuous. Change transmitter every 90 days, Disp: 1 each, Rfl: 3  •  insulin aspart (NovoLOG) 100 UNIT/ML injection, USE IN INSULIN PUMP AS DIRECTED. MAXIMUM DAILY DOSE: 50 UNITS (DISCARD VIAL 28 DAYS AFTER OPENING), Disp: 50 mL, Rfl: 1  •  insulin patient supplied pump, Inject  under the skin into the appropriate area as directed Continuous. Novolog insulin- per patient ratio of 10:1, Disp: , Rfl:       Assessment & Plan   1.  Diabetes mellitus type 1: Fair control, he feels like his correction factor is too strong, I have asked him to change his correction factor to 1 unit for each 40 mg blood sugar.  Rest of the insulin pump settings will continue as it is.  Advised to always keep glucose source in case of low blood sugars.  Recommend annual eye exam.  We will follow blood sugars and A1c.    2.  Bilateral calluses: He did follow with Podiatry.    3.  Vitamin D deficiency: On vitamin D supplementation.    Assessment and plan from November 29, 2021 reviewed and updated.            Skylar Barnes MD FACE.

## 2022-09-07 DIAGNOSIS — E10.65 TYPE 1 DIABETES MELLITUS WITH HYPERGLYCEMIA: Primary | ICD-10-CM

## 2022-09-08 RX ORDER — INSULIN ASPART 100 [IU]/ML
INJECTION, SOLUTION INTRAVENOUS; SUBCUTANEOUS
Qty: 50 ML | Refills: 1 | Status: SHIPPED | OUTPATIENT
Start: 2022-09-08

## 2022-09-08 NOTE — TELEPHONE ENCOUNTER
LMOV for patient to call us and schedule his follow up appt. Approving refill because he was just seen but will need that appt scheduled to approve future refills.

## 2022-11-08 ENCOUNTER — TELEPHONE (OUTPATIENT)
Dept: ENDOCRINOLOGY | Facility: CLINIC | Age: 42
End: 2022-11-08

## 2022-11-08 NOTE — TELEPHONE ENCOUNTER
On MD desk ready for signature and then will fax to [facility]. Also emailed [facility] rep Ashley b/c pt stated he is having difficulty getting correct amount of Dexcom sensors in his order.    Tx to Mar[facility] to schedule f/u MD visit

## 2023-02-16 ENCOUNTER — LAB (OUTPATIENT)
Dept: LAB | Facility: HOSPITAL | Age: 43
End: 2023-02-16
Payer: COMMERCIAL

## 2023-02-16 ENCOUNTER — OFFICE VISIT (OUTPATIENT)
Dept: ENDOCRINOLOGY | Facility: CLINIC | Age: 43
End: 2023-02-16
Payer: COMMERCIAL

## 2023-02-16 VITALS
WEIGHT: 190 LBS | DIASTOLIC BLOOD PRESSURE: 82 MMHG | HEART RATE: 75 BPM | OXYGEN SATURATION: 97 % | BODY MASS INDEX: 28.79 KG/M2 | HEIGHT: 68 IN | SYSTOLIC BLOOD PRESSURE: 120 MMHG

## 2023-02-16 DIAGNOSIS — E11.42 DIABETIC PERIPHERAL NEUROPATHY: ICD-10-CM

## 2023-02-16 DIAGNOSIS — E10.65 TYPE 1 DIABETES MELLITUS WITH HYPERGLYCEMIA: Primary | ICD-10-CM

## 2023-02-16 DIAGNOSIS — E10.65 TYPE 1 DIABETES MELLITUS WITH HYPERGLYCEMIA: ICD-10-CM

## 2023-02-16 PROCEDURE — 95251 CONT GLUC MNTR ANALYSIS I&R: CPT | Performed by: INTERNAL MEDICINE

## 2023-02-16 PROCEDURE — 82570 ASSAY OF URINE CREATININE: CPT

## 2023-02-16 PROCEDURE — 83036 HEMOGLOBIN GLYCOSYLATED A1C: CPT

## 2023-02-16 PROCEDURE — 82043 UR ALBUMIN QUANTITATIVE: CPT

## 2023-02-16 PROCEDURE — 36415 COLL VENOUS BLD VENIPUNCTURE: CPT

## 2023-02-16 PROCEDURE — 80053 COMPREHEN METABOLIC PANEL: CPT

## 2023-02-16 PROCEDURE — 80061 LIPID PANEL: CPT

## 2023-02-16 PROCEDURE — 99214 OFFICE O/P EST MOD 30 MIN: CPT | Performed by: INTERNAL MEDICINE

## 2023-02-16 RX ORDER — ERGOCALCIFEROL (VITAMIN D2) 50 MCG
2000 CAPSULE ORAL DAILY
Qty: 100 CAPSULE | Refills: 4 | Status: SHIPPED | OUTPATIENT
Start: 2023-02-16

## 2023-02-16 RX ORDER — MAGNESIUM 200 MG
1000 TABLET ORAL DAILY
Qty: 100 EACH | Refills: 4 | Status: SHIPPED | OUTPATIENT
Start: 2023-02-16

## 2023-02-16 NOTE — PROGRESS NOTES
Endocrine Progress Note Outpatient     Patient Care Team:  Provider, No Known as PCP - General    Chief Complaint: Follow-up type 1 diabetes    HPI: 42-year-old male with history of type 1 diabetes for last 16 years is here for follow-up    He is currently using Tandem insulin pump with Dexcom since last 2/2022. He likes this system, now using Control IQ. His current insulin pump settings are as follows.  Basal rate: Midnight 0.85 units/h, 12 PM 0.80 units/h. His insulin carb ratio is 1 unit for each 10 g of carbohydrate.  His insulin correction factor at midnight is 1 unit for each 40 mg blood sugars and at noon is 1 unit for each 30 mg blood sugars.  Active insulin is 3 hours with a target blood sugar of 110. He uses NovoLog insulin in the pump.      He has not been hospitalized since last seen for either low or high blood sugar and has not required any assistance or hospitalization.    Beginning to have some burning and tingling in the feet most likely has onset of diabetic neuropathy.    Past Medical History:   Diagnosis Date   • Diabetes mellitus (HCC)     Type 1 (14 years)   • Diabetes mellitus type I (HCC)    • Numbness of arm        Social History     Socioeconomic History   • Marital status:      Spouse name: Rosario   • Number of children: 3   • Years of education: 12   Tobacco Use   • Smoking status: Every Day     Packs/day: 1.00     Years: 22.00     Pack years: 22.00     Types: Cigarettes   • Smokeless tobacco: Never   Vaping Use   • Vaping Use: Never used   Substance and Sexual Activity   • Alcohol use: Yes     Alcohol/week: 2.0 standard drinks     Types: 1 Cans of beer, 1 Shots of liquor per week     Comment: Weekends 5-6 beers, tequilla, whiskey   • Drug use: Yes     Types: Marijuana     Comment: Daily   • Sexual activity: Yes     Partners: Female     Birth control/protection: None       Family History   Problem Relation Age of Onset   • Diabetes Son         Type 1        No Known  Allergies    ROS:   Constitutional:  Denies fatigue, tiredness.    Eyes:  Denies change in visual acuity   HENT:  Denies nasal congestion or sore throat   Respiratory: denies cough, shortness of breath.   Cardiovascular:  denies chest pain, edema   GI:  Denies abdominal pain, nausea, vomiting.   Musculoskeletal:  Denies back pain or joint pain   Integument:  Denies dry skin and rash   Neurologic:  Denies headache, focal weakness or sensory changes   Endocrine:  Denies polyuria or polydipsia   Psychiatric:  Denies depression or anxiety      Vitals:    02/16/23 1448   BP: 120/82   Pulse: 75   SpO2: 97%     Body mass index is 28.89 kg/m².     Physical Exam:  GEN: NAD, conversant  EYES: EOMI, PERRL, no conjunctival erythema  NECK: no thyromegaly, full ROM   CV: RRR, no murmurs/rubs/gallops, no peripheral edema  LUNG: CTAB, no wheezes/rales/ronchi  SKIN: no rashes, no acanthosis  MSK: no deformities, full ROM of all extremities  NEURO: no tremors, DTR normal  PSYCH: AOX3, appropriate mood, affect normal      Results Review:     I reviewed the patient's new clinical results.    Lab Results   Component Value Date    HGBA1C 7.0 (H) 06/07/2022    HGBA1C 8.3 (H) 11/22/2021    HGBA1C 9.4 (H) 04/25/2021      Lab Results   Component Value Date    GLUCOSE 85 06/07/2022    BUN 12 06/07/2022    CREATININE 0.91 06/07/2022    EGFRIFNONA 80 11/22/2021    BCR 13.2 06/07/2022    K 4.7 06/07/2022    CO2 25.0 06/07/2022    CALCIUM 9.5 06/07/2022    ALBUMIN 4.80 06/07/2022    LABIL2 1.7 09/17/2019    AST 24 06/07/2022    ALT 17 06/07/2022    CHOL 173 06/07/2022    TRIG 111 06/07/2022    LDL 92 06/07/2022    HDL 61 (H) 06/07/2022     Lab Results   Component Value Date    TSH 2.250 09/17/2019    FREET4 1.16 09/17/2019     Dexcom download interpretation: Dates covered was between February 9, 2023 to February 15, 2023.  His average blood sugar is 142.  He is spending 85% in the range, 1% below range and 14% above range.  With regards to his  glucose graph does not have any significant fluctuations.    Medication Review: Reviewed.       Current Outpatient Medications:   •  Continuous Blood Gluc  (Dexcom G6 ) device, 1 Device Daily., Disp: 1 each, Rfl: 1  •  Continuous Blood Gluc Sensor (Dexcom G6 Sensor), Pt to use to continuously monitor his blood sugars.  Pt to change sensor every 10 days, Disp: 9 each, Rfl: 3  •  Continuous Blood Gluc Transmit (Dexcom G6 Transmitter) misc, Inject 1 Device under the skin into the appropriate area as directed Continuous. Change transmitter every 90 days, Disp: 1 each, Rfl: 3  •  hydrocortisone 1 % cream, Apply to affected area 2 times daily, Disp: 30 g, Rfl: 1  •  insulin patient supplied pump, Inject  under the skin into the appropriate area as directed Continuous. Novolog insulin- per patient ratio of 10:1, Disp: , Rfl:   •  NovoLOG 100 UNIT/ML injection, USE IN INSULIN PUMP AS DIRECTED. MAXIMUM DAILY DOSE: 50 UNITS (DISCARD VIAL 28 DAYS AFTER OPENING), Disp: 50 mL, Rfl: 1          Assessment and plan:  Diabetes mellitus type 1 with hyperglycemia: Overall doing well, no labs.  We will check A1c.  Continue current insulin pump settings.  Advised to always keep glucose source in case of low blood sugars and recommend annual eye exam and flu vaccine.    Diabetic peripheral neuropathy: We will add vitamin D and B12 supplementation.           Skylar Barnes MD FACE.

## 2023-02-16 NOTE — PATIENT INSTRUCTIONS
Please stop smoking  Start vitamin D 2000 units p.o. daily  Start vitamin B12 1000 mcg sublingual daily  Continue to use insulin pump  Always keep glucose source in case of low blood sugar  Annual eye exam  Labs today.

## 2023-02-17 LAB
ALBUMIN SERPL-MCNC: 4.6 G/DL (ref 3.5–5.2)
ALBUMIN UR-MCNC: 1.9 MG/DL
ALBUMIN/GLOB SERPL: 1.8 G/DL
ALP SERPL-CCNC: 67 U/L (ref 39–117)
ALT SERPL W P-5'-P-CCNC: 17 U/L (ref 1–41)
ANION GAP SERPL CALCULATED.3IONS-SCNC: 9.7 MMOL/L (ref 5–15)
AST SERPL-CCNC: 22 U/L (ref 1–40)
BILIRUB SERPL-MCNC: 0.5 MG/DL (ref 0–1.2)
BUN SERPL-MCNC: 16 MG/DL (ref 6–20)
BUN/CREAT SERPL: 15 (ref 7–25)
CALCIUM SPEC-SCNC: 9.7 MG/DL (ref 8.6–10.5)
CHLORIDE SERPL-SCNC: 101 MMOL/L (ref 98–107)
CHOLEST SERPL-MCNC: 172 MG/DL (ref 0–200)
CO2 SERPL-SCNC: 30.3 MMOL/L (ref 22–29)
CREAT SERPL-MCNC: 1.07 MG/DL (ref 0.76–1.27)
CREAT UR-MCNC: 171.3 MG/DL
EGFRCR SERPLBLD CKD-EPI 2021: 87.8 ML/MIN/1.73
GLOBULIN UR ELPH-MCNC: 2.5 GM/DL
GLUCOSE SERPL-MCNC: 165 MG/DL (ref 65–99)
HBA1C MFR BLD: 8.4 % (ref 3.5–5.6)
HDLC SERPL-MCNC: 76 MG/DL (ref 40–60)
LDLC SERPL CALC-MCNC: 80 MG/DL (ref 0–100)
LDLC/HDLC SERPL: 1.03 {RATIO}
MICROALBUMIN/CREAT UR: 11.1 MG/G
POTASSIUM SERPL-SCNC: 4.9 MMOL/L (ref 3.5–5.2)
PROT SERPL-MCNC: 7.1 G/DL (ref 6–8.5)
SODIUM SERPL-SCNC: 141 MMOL/L (ref 136–145)
TRIGL SERPL-MCNC: 88 MG/DL (ref 0–150)
VLDLC SERPL-MCNC: 16 MG/DL (ref 5–40)

## 2023-03-30 ENCOUNTER — TELEPHONE (OUTPATIENT)
Dept: ENDOCRINOLOGY | Facility: CLINIC | Age: 43
End: 2023-03-30
Payer: COMMERCIAL

## 2023-03-30 NOTE — TELEPHONE ENCOUNTER
CMN form from [facility] Filled out and put in Dr Barnes's inbox for signature.  Will fax once signed along with last ON

## 2023-05-25 NOTE — PROGRESS NOTES
Neurosurgical Consultation      Billy Chaudhari is a 42 y.o. male is being seen for consultation today at the request of Yvan Barnes MD for neck pain. Today patient reports neck pain with numbness tingling in his right arm and hand.    Chief Complaint   Patient presents with   • Neck Pain     New evaluation        Previous treatment: accupuncture,celebrex, home exercise    HPI: This is a 42-year-old gentleman with a history of type 1 diabetes and a most recent hemoglobin A1c of 8.4% who presents to the neurosurgery clinic for evaluation of right arm pain as well as numbness and tingling.  He notes that without clear initiation approximately 16 months ago he had onset of right arm numbness and tingling from his shoulder down into his hand.  It is predominantly in his thumb and first 2 digits.  He was prescribed oral steroids without any significant improvement.  He did undergo acupuncture care and had significant improvement in his pain.  His pain began to return in January of this year.  He has since returned to acupuncture but it has not provided any significant relief.  The pain into his right arm is worse with turning his head to the left or sitting still for an extended period of time the pain and numbness and tingling is constant.  He has attempted at home stretching without significant improvement.  He was prescribed a course of Celebrex but never filled the prescription.  He has not completed any physical therapy or any spinal canal injections.  He is an everyday smoker for approximately 30 years at 1 pack/day.  He consumes approximately 3 alcoholic drinks per day as well as recreational marijuana.    Past Medical History:   Diagnosis Date   • Diabetes mellitus     Type 1 (14 years)   • Diabetes mellitus type I    • Numbness of arm         Past Surgical History:   Procedure Laterality Date   • CYST REMOVAL     • HERNIA REPAIR          Current Outpatient Medications on File Prior to Visit   Medication Sig  Dispense Refill   • celecoxib (CeleBREX) 200 MG capsule      • Continuous Blood Gluc  (Dexcom G6 ) device 1 Device Daily. 1 each 1   • Continuous Blood Gluc Sensor (Dexcom G6 Sensor) Pt to use to continuously monitor his blood sugars.  Pt to change sensor every 10 days 9 each 3   • Continuous Blood Gluc Transmit (Dexcom G6 Transmitter) misc Inject 1 Device under the skin into the appropriate area as directed Continuous. Change transmitter every 90 days 1 each 3   • Cyanocobalamin (Vitamin B-12) 1000 MCG sublingual tablet Place 1 tablet under the tongue Daily. 100 each 4   • hydrocortisone 1 % cream Apply to affected area 2 times daily 30 g 1   • insulin patient supplied pump Inject  under the skin into the appropriate area as directed Continuous. Novolog insulin- per patient ratio of 10:1     • NovoLOG 100 UNIT/ML injection USE IN INSULIN PUMP AS DIRECTED. MAXIMUM DAILY DOSE: 50 UNITS (DISCARD VIAL 28 DAYS AFTER OPENING) 50 mL 1   • Vitamin D, Ergocalciferol, 50 MCG (2000 UT) capsule Take 2,000 Units by mouth Daily. 100 capsule 4     No current facility-administered medications on file prior to visit.        No Known Allergies     Social History     Socioeconomic History   • Marital status:      Spouse name: Rosario   • Number of children: 3   • Years of education: 12   Tobacco Use   • Smoking status: Every Day     Packs/day: 1.00     Years: 22.00     Pack years: 22.00     Types: Cigarettes   • Smokeless tobacco: Never   Vaping Use   • Vaping Use: Never used   Substance and Sexual Activity   • Alcohol use: Yes     Alcohol/week: 2.0 standard drinks     Types: 1 Cans of beer, 1 Shots of liquor per week     Comment: Weekends 5-6 beers, tequilla, whiskey   • Drug use: Yes     Types: Marijuana     Comment: Daily   • Sexual activity: Yes     Partners: Female     Birth control/protection: None          Review of Systems   Constitutional: Positive for activity change.   HENT: Negative.    Eyes: Negative.  "   Respiratory: Negative.    Cardiovascular: Negative.    Endocrine: Negative.    Genitourinary: Negative.    Musculoskeletal: Positive for arthralgias, myalgias, neck pain and neck stiffness.   Skin: Negative.    Neurological: Positive for numbness (tingling).   Hematological: Negative.    Psychiatric/Behavioral: Positive for sleep disturbance.        Physical Examination:     Vitals:    05/30/23 1353   BP: 132/82   Pulse: 62   Weight: 87.2 kg (192 lb 3.2 oz)   Height: 172.7 cm (68\")   PainSc:   6        Physical Exam  Eyes:      General: Lids are normal.      Extraocular Movements: Extraocular movements intact.      Pupils: Pupils are equal, round, and reactive to light.   Neurological:      Deep Tendon Reflexes:      Reflex Scores:       Tricep reflexes are 1+ on the right side and 1+ on the left side.       Bicep reflexes are 0 on the right side and 0 on the left side.       Brachioradialis reflexes are 0 on the right side and 0 on the left side.  Psychiatric:         Speech: Speech normal.          Neurological Exam  Mental Status  Awake, alert and oriented to person, place and time. Speech is normal. Language is fluent with no aphasia.    Cranial Nerves  CN II: Visual fields full to confrontation.  CN III, IV, VI: Extraocular movements intact bilaterally. Normal lids and orbits bilaterally. Pupils equal round and reactive to light bilaterally.  CN V: Facial sensation is normal.  CN VII: Full and symmetric facial movement.  CN IX, X: Palate elevates symmetrically  CN XI: Shoulder shrug strength is normal.  CN XII: Tongue midline without atrophy or fasciculations.    Motor  Normal muscle bulk throughout. Fasciculations present: Strength is 5/5 in all four extremities except as noted.  Subtle fasciculations of the tricep appreciated during exam.  4+ out of 5 in the right deltoid and tricep.    Sensory  Right: Loss of sensation in the C6 and C7 dermatome.    Reflexes                                            " Right                      Left  Brachioradialis                    0                         0  Biceps                                 0                         0  Triceps                                1+                         1+    Right pathological reflexes: Wai's absent.  Left pathological reflexes: Wai's absent.    Coordination  Right: Finger-to-nose normal.Left: Finger-to-nose normal.    Gait   Able to rise from chair without using arms.     Positive Tinel's sign at the wrist and elbow indicative of median and ulnar nerve irritation.    Result Review  The following data was reviewed by: Moisés Lu MD on 05/30/2023:    Data reviewed: Radiologic studies MRI of the cervical spine from April 10, 2023 shows a large right-sided disc extrusion with some inferior displacement causing significant lateral recess stenosis.  There is a C4-C5 disc osteophyte complex that does cause mild to moderate central canal stenosis as well as severe bilateral foraminal stenosis.  There is severe left foraminal stenosis at C3-C4.     Assessment/plan:  This is a 42-year-old gentleman who appears to have a chronic right-sided C6 and potentially C7 radiculopathy secondary to a C5-C6 right-sided eccentric disc herniation.  At this juncture he would like to attempt nonsurgical means.  I will order him a course of physical therapy.  I do encourage the physical therapist to include traction therapy as well as potentially trigger point needling with their modalities.  I am also recommending a right-sided eccentric C5-C6 transforaminal epidural steroid injection which will need to be done at another [facility] facility as we do not have this technique within the system.  I am also ordering him a flexion-extension x-ray of the cervical spine to evaluate for any dynamic instability.  He will return to see me in approximately 8 weeks.  I discussed with him reasons to return sooner and he expresses understanding.  I have encouraged  him to call with any questions or concerns.    Diagnoses and all orders for this visit:    1. Cervical radiculopathy at C6 (Primary)  -     XR spine cervical ap and lat w flex and ext; Future  -     Ambulatory Referral to Physical Therapy Evaluate and treat  -     Ambulatory Referral to Pain Management Clinic    2. Cervical radiculopathy at C7  -     XR spine cervical ap and lat w flex and ext; Future  -     Ambulatory Referral to Physical Therapy Evaluate and treat         Return in about 2 months (around 7/30/2023).            Moisés Lu MD

## 2023-05-30 ENCOUNTER — HOSPITAL ENCOUNTER (OUTPATIENT)
Dept: GENERAL RADIOLOGY | Facility: HOSPITAL | Age: 43
Discharge: HOME | End: 2023-05-30
Admitting: NEUROLOGICAL SURGERY

## 2023-05-30 ENCOUNTER — OFFICE VISIT (OUTPATIENT)
Dept: NEUROSURGERY | Facility: CLINIC | Age: 43
End: 2023-05-30

## 2023-05-30 VITALS
HEART RATE: 62 BPM | SYSTOLIC BLOOD PRESSURE: 132 MMHG | BODY MASS INDEX: 29.13 KG/M2 | DIASTOLIC BLOOD PRESSURE: 82 MMHG | WEIGHT: 192.2 LBS | HEIGHT: 68 IN

## 2023-05-30 DIAGNOSIS — M54.12 CERVICAL RADICULOPATHY AT C7: ICD-10-CM

## 2023-05-30 DIAGNOSIS — M54.12 CERVICAL RADICULOPATHY AT C6: Primary | ICD-10-CM

## 2023-05-30 DIAGNOSIS — M54.12 CERVICAL RADICULOPATHY AT C6: ICD-10-CM

## 2023-05-30 PROCEDURE — 72050 X-RAY EXAM NECK SPINE 4/5VWS: CPT

## 2023-05-30 RX ORDER — CELECOXIB 200 MG/1
CAPSULE ORAL
COMMUNITY
Start: 2023-03-30

## 2023-05-31 DIAGNOSIS — E10.65 TYPE 1 DIABETES MELLITUS WITH HYPERGLYCEMIA: ICD-10-CM

## 2023-05-31 RX ORDER — INSULIN ASPART 100 [IU]/ML
INJECTION, SOLUTION INTRAVENOUS; SUBCUTANEOUS
Qty: 50 ML | Refills: 3 | Status: SHIPPED | OUTPATIENT
Start: 2023-05-31

## 2023-06-02 ENCOUNTER — PATIENT ROUNDING (BHMG ONLY) (OUTPATIENT)
Dept: NEUROSURGERY | Facility: CLINIC | Age: 43
End: 2023-06-02

## 2023-06-02 NOTE — PROGRESS NOTES
A My-Chart message has been sent to the patient for PATIENT ROUNDING with [facility]

## 2023-07-05 ENCOUNTER — TELEPHONE (OUTPATIENT)
Dept: NEUROSURGERY | Facility: CLINIC | Age: 43
End: 2023-07-05

## 2023-07-05 NOTE — TELEPHONE ENCOUNTER
Caller: Billy Chaudhari    Relationship to patient: Self    Best call back number: [phone]    Patient is needing:     PATIENT TRIED TO MAKE APPT WITH DR REBECCA COLON, HOWEVER THEY STATES THEY DO NOT HAVE THE REFERRAL.    PLEASE FAX REFERRAL [phone]  OR CALL [phone]

## 2023-07-25 ENCOUNTER — LAB (OUTPATIENT)
Dept: LAB | Facility: HOSPITAL | Age: 43
End: 2023-07-25
Payer: COMMERCIAL

## 2023-07-25 DIAGNOSIS — E10.65 TYPE 1 DIABETES MELLITUS WITH HYPERGLYCEMIA: Primary | ICD-10-CM

## 2023-07-25 PROCEDURE — 82570 ASSAY OF URINE CREATININE: CPT

## 2023-07-25 PROCEDURE — 36415 COLL VENOUS BLD VENIPUNCTURE: CPT

## 2023-07-25 PROCEDURE — 82043 UR ALBUMIN QUANTITATIVE: CPT

## 2023-07-25 PROCEDURE — 80061 LIPID PANEL: CPT

## 2023-07-25 PROCEDURE — 80053 COMPREHEN METABOLIC PANEL: CPT

## 2023-07-25 PROCEDURE — 83036 HEMOGLOBIN GLYCOSYLATED A1C: CPT

## 2023-07-26 LAB
ALBUMIN SERPL-MCNC: 4.4 G/DL (ref 3.5–5.2)
ALBUMIN UR-MCNC: <1.2 MG/DL
ALBUMIN/GLOB SERPL: 2.3 G/DL
ALP SERPL-CCNC: 61 U/L (ref 39–117)
ALT SERPL W P-5'-P-CCNC: 19 U/L (ref 1–41)
ANION GAP SERPL CALCULATED.3IONS-SCNC: 11.4 MMOL/L (ref 5–15)
AST SERPL-CCNC: 21 U/L (ref 1–40)
BILIRUB SERPL-MCNC: 0.4 MG/DL (ref 0–1.2)
BUN SERPL-MCNC: 19 MG/DL (ref 6–20)
BUN/CREAT SERPL: 17.1 (ref 7–25)
CALCIUM SPEC-SCNC: 9.3 MG/DL (ref 8.6–10.5)
CHLORIDE SERPL-SCNC: 103 MMOL/L (ref 98–107)
CHOLEST SERPL-MCNC: 155 MG/DL (ref 0–200)
CO2 SERPL-SCNC: 25.6 MMOL/L (ref 22–29)
CREAT SERPL-MCNC: 1.11 MG/DL (ref 0.76–1.27)
CREAT UR-MCNC: 105.7 MG/DL
EGFRCR SERPLBLD CKD-EPI 2021: 83.5 ML/MIN/1.73
GLOBULIN UR ELPH-MCNC: 1.9 GM/DL
GLUCOSE SERPL-MCNC: 223 MG/DL (ref 65–99)
HBA1C MFR BLD: 7.9 % (ref 4.8–5.6)
HDLC SERPL-MCNC: 58 MG/DL (ref 40–60)
LDLC SERPL CALC-MCNC: 76 MG/DL (ref 0–100)
LDLC/HDLC SERPL: 1.27 {RATIO}
MICROALBUMIN/CREAT UR: NORMAL MG/G{CREAT}
POTASSIUM SERPL-SCNC: 4.8 MMOL/L (ref 3.5–5.2)
PROT SERPL-MCNC: 6.3 G/DL (ref 6–8.5)
SODIUM SERPL-SCNC: 140 MMOL/L (ref 136–145)
TRIGL SERPL-MCNC: 117 MG/DL (ref 0–150)
VLDLC SERPL-MCNC: 21 MG/DL (ref 5–40)

## 2023-08-03 ENCOUNTER — OFFICE VISIT (OUTPATIENT)
Dept: ENDOCRINOLOGY | Facility: CLINIC | Age: 43
End: 2023-08-03
Payer: COMMERCIAL

## 2023-08-03 VITALS
WEIGHT: 187 LBS | OXYGEN SATURATION: 98 % | HEIGHT: 68 IN | HEART RATE: 74 BPM | DIASTOLIC BLOOD PRESSURE: 80 MMHG | SYSTOLIC BLOOD PRESSURE: 125 MMHG | BODY MASS INDEX: 28.34 KG/M2

## 2023-08-03 DIAGNOSIS — E10.65 TYPE 1 DIABETES MELLITUS WITH HYPERGLYCEMIA: Primary | ICD-10-CM

## 2023-08-03 DIAGNOSIS — E11.42 DIABETIC PERIPHERAL NEUROPATHY: ICD-10-CM

## 2023-08-03 PROCEDURE — 95251 CONT GLUC MNTR ANALYSIS I&R: CPT | Performed by: INTERNAL MEDICINE

## 2023-08-03 PROCEDURE — 99214 OFFICE O/P EST MOD 30 MIN: CPT | Performed by: INTERNAL MEDICINE

## 2023-08-03 RX ORDER — ERGOCALCIFEROL (VITAMIN D2) 50 MCG
2000 CAPSULE ORAL DAILY
Qty: 100 CAPSULE | Refills: 4 | Status: SHIPPED | OUTPATIENT
Start: 2023-08-03

## 2023-08-03 RX ORDER — MAGNESIUM 200 MG
1000 TABLET ORAL DAILY
Qty: 100 EACH | Refills: 4 | Status: SHIPPED | OUTPATIENT
Start: 2023-08-03

## 2024-02-16 ENCOUNTER — TELEPHONE (OUTPATIENT)
Dept: ENDOCRINOLOGY | Facility: CLINIC | Age: 44
End: 2024-02-16
Payer: COMMERCIAL

## 2024-02-16 RX ORDER — INSULIN LISPRO 100 [IU]/ML
INJECTION, SOLUTION INTRAVENOUS; SUBCUTANEOUS
Qty: 40 ML | Refills: 2 | Status: SHIPPED | OUTPATIENT
Start: 2024-02-16

## 2024-02-21 ENCOUNTER — TELEPHONE (OUTPATIENT)
Dept: ENDOCRINOLOGY | Facility: CLINIC | Age: 44
End: 2024-02-21
Payer: COMMERCIAL

## 2024-02-21 NOTE — TELEPHONE ENCOUNTER
Spoke with patient and let him know last OV notes were faxed to [facility] for pump supplies. Pt wanted to upgrade Dexcom. Sent in rx for Dexcom G7 through [facility] with [facility] services with office notes from 8/3/2023

## 2024-02-28 ENCOUNTER — TELEPHONE (OUTPATIENT)
Dept: ENDOCRINOLOGY | Facility: CLINIC | Age: 44
End: 2024-02-28
Payer: COMMERCIAL

## 2024-02-28 NOTE — TELEPHONE ENCOUNTER
Spoke with patient and he has not received insulin from [facility] pharmacy that was sent in on 2/16/24. Pt has not called [facility] pharmacy to see why he has not received insulin. Pt almost out, Kaylynn sent rx for humalog to Ziyad @ [facility] [place] point per pt request.

## 2024-03-04 ENCOUNTER — TELEPHONE (OUTPATIENT)
Dept: ENDOCRINOLOGY | Facility: CLINIC | Age: 44
End: 2024-03-04
Payer: COMMERCIAL

## 2024-03-04 NOTE — TELEPHONE ENCOUNTER
Caller: Billy Chaudhari    Relationship to patient: Self    Best call back number: [phone]    Patient is needing: PATIENT IS NEEDING TO RESCHEDULE HIS LAB AND OFFICE VISIT APPT.         0 = understands/communicates without difficulty

## 2024-04-16 ENCOUNTER — LAB (OUTPATIENT)
Dept: LAB | Facility: HOSPITAL | Age: 44
End: 2024-04-16
Payer: COMMERCIAL

## 2024-04-16 DIAGNOSIS — E10.65 TYPE 1 DIABETES MELLITUS WITH HYPERGLYCEMIA: ICD-10-CM

## 2024-04-16 LAB
ALBUMIN SERPL-MCNC: 4.6 G/DL (ref 3.5–5.2)
ALBUMIN UR-MCNC: <1.2 MG/DL
ALBUMIN/GLOB SERPL: 2 G/DL
ALP SERPL-CCNC: 64 U/L (ref 39–117)
ALT SERPL W P-5'-P-CCNC: 17 U/L (ref 1–41)
ANION GAP SERPL CALCULATED.3IONS-SCNC: 10 MMOL/L (ref 5–15)
AST SERPL-CCNC: 16 U/L (ref 1–40)
BILIRUB SERPL-MCNC: 0.4 MG/DL (ref 0–1.2)
BUN SERPL-MCNC: 19 MG/DL (ref 6–20)
BUN/CREAT SERPL: 15.7 (ref 7–25)
CALCIUM SPEC-SCNC: 9.3 MG/DL (ref 8.6–10.5)
CHLORIDE SERPL-SCNC: 99 MMOL/L (ref 98–107)
CHOLEST SERPL-MCNC: 166 MG/DL (ref 0–200)
CO2 SERPL-SCNC: 28 MMOL/L (ref 22–29)
CREAT SERPL-MCNC: 1.21 MG/DL (ref 0.76–1.27)
CREAT UR-MCNC: 96.3 MG/DL
EGFRCR SERPLBLD CKD-EPI 2021: 75.2 ML/MIN/1.73
GLOBULIN UR ELPH-MCNC: 2.3 GM/DL
GLUCOSE SERPL-MCNC: 286 MG/DL (ref 65–99)
HBA1C MFR BLD: 8 % (ref 4.8–5.6)
HDLC SERPL-MCNC: 71 MG/DL (ref 40–60)
LDLC SERPL CALC-MCNC: 80 MG/DL (ref 0–100)
LDLC/HDLC SERPL: 1.12 {RATIO}
MICROALBUMIN/CREAT UR: NORMAL MG/G{CREAT}
POTASSIUM SERPL-SCNC: 4.5 MMOL/L (ref 3.5–5.2)
PROT SERPL-MCNC: 6.9 G/DL (ref 6–8.5)
SODIUM SERPL-SCNC: 137 MMOL/L (ref 136–145)
TRIGL SERPL-MCNC: 78 MG/DL (ref 0–150)
VLDLC SERPL-MCNC: 15 MG/DL (ref 5–40)

## 2024-04-16 PROCEDURE — 36415 COLL VENOUS BLD VENIPUNCTURE: CPT

## 2024-04-16 PROCEDURE — 82043 UR ALBUMIN QUANTITATIVE: CPT

## 2024-04-16 PROCEDURE — 82570 ASSAY OF URINE CREATININE: CPT

## 2024-04-16 PROCEDURE — 80053 COMPREHEN METABOLIC PANEL: CPT

## 2024-04-16 PROCEDURE — 83036 HEMOGLOBIN GLYCOSYLATED A1C: CPT

## 2024-04-16 PROCEDURE — 80061 LIPID PANEL: CPT

## 2024-04-23 ENCOUNTER — OFFICE VISIT (OUTPATIENT)
Dept: ENDOCRINOLOGY | Facility: CLINIC | Age: 44
End: 2024-04-23
Payer: COMMERCIAL

## 2024-04-23 VITALS
WEIGHT: 191 LBS | SYSTOLIC BLOOD PRESSURE: 122 MMHG | DIASTOLIC BLOOD PRESSURE: 75 MMHG | HEIGHT: 68 IN | BODY MASS INDEX: 28.95 KG/M2 | HEART RATE: 74 BPM | OXYGEN SATURATION: 98 %

## 2024-04-23 DIAGNOSIS — E11.42 DIABETIC PERIPHERAL NEUROPATHY: ICD-10-CM

## 2024-04-23 DIAGNOSIS — E10.65 TYPE 1 DIABETES MELLITUS WITH HYPERGLYCEMIA: Primary | ICD-10-CM

## 2024-04-23 PROCEDURE — 95251 CONT GLUC MNTR ANALYSIS I&R: CPT | Performed by: INTERNAL MEDICINE

## 2024-04-23 PROCEDURE — 99214 OFFICE O/P EST MOD 30 MIN: CPT | Performed by: INTERNAL MEDICINE

## 2024-04-23 RX ORDER — ACYCLOVIR 400 MG/1
1 TABLET ORAL DAILY
Qty: 1 EACH | Refills: 0 | Status: SHIPPED | OUTPATIENT
Start: 2024-04-23

## 2024-04-23 RX ORDER — ACYCLOVIR 400 MG/1
1 TABLET ORAL DAILY
Qty: 9 EACH | Refills: 3 | Status: SHIPPED | OUTPATIENT
Start: 2024-04-23

## 2024-04-23 RX ORDER — INSULIN LISPRO 100 [IU]/ML
INJECTION, SOLUTION INTRAVENOUS; SUBCUTANEOUS
Qty: 60 ML | Refills: 3 | Status: SHIPPED | OUTPATIENT
Start: 2024-04-23 | End: 2024-04-23 | Stop reason: SDUPTHER

## 2024-04-23 RX ORDER — ERGOCALCIFEROL (VITAMIN D2) 50 MCG
2000 CAPSULE ORAL DAILY
Qty: 100 CAPSULE | Refills: 4 | Status: SHIPPED | OUTPATIENT
Start: 2024-04-23

## 2024-04-23 RX ORDER — MAGNESIUM 200 MG
1000 TABLET ORAL DAILY
Qty: 100 EACH | Refills: 4 | Status: SHIPPED | OUTPATIENT
Start: 2024-04-23

## 2024-04-23 RX ORDER — INSULIN LISPRO 100 [IU]/ML
INJECTION, SOLUTION INTRAVENOUS; SUBCUTANEOUS
Qty: 60 ML | Refills: 3 | Status: SHIPPED | OUTPATIENT
Start: 2024-04-23

## 2024-04-23 NOTE — PATIENT INSTRUCTIONS
Please stop smoking in any shape or form  Always keep glucose source in case of low blood sugar  Continue to work on your diet and activity  Labs before follow-up.

## 2024-04-23 NOTE — PROGRESS NOTES
Endocrine Progress Note Outpatient     Patient Care Team:  Yvan Barnes MD as PCP - General (Hospitalist)    Chief Complaint: Follow-up type 1 diabetes    HPI: 43-year-old male with history of type 1 diabetes for last 16 years is here for follow-up    He is currently using Tandem insulin pump with Dexcom since last 2/2022. He likes this system, now using Control IQ. His current insulin pump settings are as follows.  Basal rate: Midnight 0.85 units/h, 12 PM 0.80 units/h. His insulin carb ratio is 1 unit for each 10 g of carbohydrate.  His insulin correction factor at midnight is 1 unit for each 40 mg blood sugars and at noon is 1 unit for each 30 mg blood sugars.  Active insulin is 3 hours with a target blood sugar of 110. He uses NovoLog insulin in the pump.      He has not been hospitalized since last seen for either low or high blood sugar and has not required any assistance or hospitalization.    Diabetic neuropathy : On Vit D and B12     Past Medical History:   Diagnosis Date    Diabetes mellitus     Type 1 (14 years)    Diabetes mellitus type I     Numbness of arm        Social History     Socioeconomic History    Marital status:      Spouse name: Rosario    Number of children: 3    Years of education: 12   Tobacco Use    Smoking status: Former     Current packs/day: 1.00     Average packs/day: 1 pack/day for 22.0 years (22.0 ttl pk-yrs)     Types: Cigarettes    Smokeless tobacco: Never   Vaping Use    Vaping status: Every Day    Substances: Flavoring    Devices: Disposable   Substance and Sexual Activity    Alcohol use: Yes     Alcohol/week: 2.0 standard drinks of alcohol     Types: 1 Cans of beer, 1 Shots of liquor per week     Comment: Weekends 5-6 beers, tequilla, whiskey    Drug use: Yes     Types: Marijuana     Comment: Daily    Sexual activity: Yes     Partners: Female     Birth control/protection: None       Family History   Problem Relation Age of Onset    Diabetes Son         Type 1        No  Known Allergies    ROS:   Constitutional:  Denies fatigue, tiredness.    Eyes:  Denies change in visual acuity   HENT:  Denies nasal congestion or sore throat   Respiratory: denies cough, shortness of breath.   Cardiovascular:  denies chest pain, edema   GI:  Denies abdominal pain, nausea, vomiting.   Musculoskeletal:  Denies back pain or joint pain   Integument:  Denies dry skin and rash   Neurologic:  Denies headache, focal weakness or sensory changes   Endocrine:  Denies polyuria or polydipsia   Psychiatric:  Denies depression or anxiety      Vitals:    04/23/24 1413   BP: 122/75   Pulse: 74   SpO2: 98%     Body mass index is 29.04 kg/m².     Physical Exam:  GEN: NAD, conversant  EYES: EOMI  NECK: no thyromegaly,  LUNG: CTA  PSYCH: AOX3, appropriate mood, affect normal      Results Review:     I reviewed the patient's new clinical results.    Lab Results   Component Value Date    HGBA1C 8.00 (H) 04/16/2024    HGBA1C 7.90 (H) 07/25/2023    HGBA1C 8.4 (H) 02/16/2023      Lab Results   Component Value Date    GLUCOSE 286 (H) 04/16/2024    BUN 19 04/16/2024    CREATININE 1.21 04/16/2024    EGFRIFNONA 80 11/22/2021    BCR 15.7 04/16/2024    K 4.5 04/16/2024    CO2 28.0 04/16/2024    CALCIUM 9.3 04/16/2024    ALBUMIN 4.6 04/16/2024    LABIL2 1.7 09/17/2019    AST 16 04/16/2024    ALT 17 04/16/2024    CHOL 166 04/16/2024    TRIG 78 04/16/2024    LDL 80 04/16/2024    HDL 71 (H) 04/16/2024     Lab Results   Component Value Date    TSH 2.250 09/17/2019    FREET4 1.16 09/17/2019     Dexcom download interpretation: Dates covered was between March 16, 2024 to March 22, 2024.  Average blood sugar is 155.  Spending 70% in the range, 29% above range and 1% below range.  His glucose graph does show some postprandial hyperglycemia.      Medication Review: Reviewed.       Current Outpatient Medications:     Continuous Blood Gluc  (Dexcom G6 ) device, 1 Device Daily., Disp: 1 each, Rfl: 1    Continuous Blood Gluc  Sensor (Dexcom G6 Sensor), Pt to use to continuously monitor his blood sugars.  Pt to change sensor every 10 days, Disp: 9 each, Rfl: 3    Continuous Blood Gluc Transmit (Dexcom G6 Transmitter) misc, Inject 1 Device under the skin into the appropriate area as directed Continuous. Change transmitter every 90 days, Disp: 1 each, Rfl: 3    Cyanocobalamin (Vitamin B-12) 1000 MCG sublingual tablet, Place 1 tablet under the tongue Daily., Disp: 100 each, Rfl: 4    Insulin Lispro (HumaLOG) 100 UNIT/ML injection, USE IN INSULIN PUMP AS DIRECTED. MAXIMUM DAILY DOSE: 50 UNITS (DISCARD VIAL 28 DAYS AFTER OPENING), Disp: 40 mL, Rfl: 2    insulin patient supplied pump, Inject  under the skin into the appropriate area as directed Continuous. Novolog insulin- per patient ratio of 10:1, Disp: , Rfl:     Vitamin D, Ergocalciferol, 50 MCG (2000 UT) capsule, Take 2,000 Units by mouth Daily., Disp: 100 capsule, Rfl: 4      Assessment and plan:  Diabetes mellitus type 1 with hyperglycemia: Uncontrolled, A1c at 8%.  I reviewed his Dexcom download, based upon that I have changed his basal rate from midnight to midnight at 0.85 units/h.  Rest of the insulin pump settings will stay as it is.  Is advised to watch for low blood sugars and always keep glucose source in case of low blood sugars.  Continue to work on diet and activity.    Diabetic peripheral neuropathy: Currently on vitamin D supplementation.    Assessment and plan from August 3, 2023 reviewed and updated.           Skylar Barnes MD FACE.

## 2024-09-10 RX ORDER — INSULIN LISPRO 100 [IU]/ML
INJECTION, SOLUTION INTRAVENOUS; SUBCUTANEOUS
Qty: 90 ML | Refills: 3 | Status: SHIPPED | OUTPATIENT
Start: 2024-09-10

## 2024-10-16 ENCOUNTER — LAB (OUTPATIENT)
Dept: LAB | Facility: HOSPITAL | Age: 44
End: 2024-10-16
Payer: COMMERCIAL

## 2024-10-16 DIAGNOSIS — E10.65 TYPE 1 DIABETES MELLITUS WITH HYPERGLYCEMIA: ICD-10-CM

## 2024-10-16 LAB
ALBUMIN SERPL-MCNC: 4.4 G/DL (ref 3.5–5.2)
ALBUMIN UR-MCNC: 1.7 MG/DL
ALBUMIN/GLOB SERPL: 1.6 G/DL
ALP SERPL-CCNC: 66 U/L (ref 39–117)
ALT SERPL W P-5'-P-CCNC: 16 U/L (ref 1–41)
ANION GAP SERPL CALCULATED.3IONS-SCNC: 11 MMOL/L (ref 5–15)
AST SERPL-CCNC: 24 U/L (ref 1–40)
BILIRUB SERPL-MCNC: 0.4 MG/DL (ref 0–1.2)
BUN SERPL-MCNC: 15 MG/DL (ref 6–20)
BUN/CREAT SERPL: 12.9 (ref 7–25)
CALCIUM SPEC-SCNC: 9.9 MG/DL (ref 8.6–10.5)
CHLORIDE SERPL-SCNC: 103 MMOL/L (ref 98–107)
CHOLEST SERPL-MCNC: 180 MG/DL (ref 0–200)
CO2 SERPL-SCNC: 28 MMOL/L (ref 22–29)
CREAT SERPL-MCNC: 1.16 MG/DL (ref 0.76–1.27)
CREAT UR-MCNC: 143.8 MG/DL
EGFRCR SERPLBLD CKD-EPI 2021: 78.7 ML/MIN/1.73
GLOBULIN UR ELPH-MCNC: 2.7 GM/DL
GLUCOSE SERPL-MCNC: 77 MG/DL (ref 65–99)
HBA1C MFR BLD: 8.45 % (ref 4.8–5.6)
HDLC SERPL-MCNC: 64 MG/DL (ref 40–60)
LDLC SERPL CALC-MCNC: 102 MG/DL (ref 0–100)
LDLC/HDLC SERPL: 1.58 {RATIO}
MICROALBUMIN/CREAT UR: 11.8 MG/G (ref 0–29)
POTASSIUM SERPL-SCNC: 4.7 MMOL/L (ref 3.5–5.2)
PROT SERPL-MCNC: 7.1 G/DL (ref 6–8.5)
SODIUM SERPL-SCNC: 142 MMOL/L (ref 136–145)
TRIGL SERPL-MCNC: 73 MG/DL (ref 0–150)
TSH SERPL DL<=0.05 MIU/L-ACNC: 2.27 UIU/ML (ref 0.27–4.2)
VLDLC SERPL-MCNC: 14 MG/DL (ref 5–40)

## 2024-10-16 PROCEDURE — 80061 LIPID PANEL: CPT

## 2024-10-16 PROCEDURE — 82570 ASSAY OF URINE CREATININE: CPT

## 2024-10-16 PROCEDURE — 84443 ASSAY THYROID STIM HORMONE: CPT

## 2024-10-16 PROCEDURE — 36415 COLL VENOUS BLD VENIPUNCTURE: CPT

## 2024-10-16 PROCEDURE — 83036 HEMOGLOBIN GLYCOSYLATED A1C: CPT

## 2024-10-16 PROCEDURE — 82043 UR ALBUMIN QUANTITATIVE: CPT

## 2024-10-16 PROCEDURE — 80053 COMPREHEN METABOLIC PANEL: CPT

## 2024-10-21 RX ORDER — ALUMINUM CHLORIDE 20 %
SOLUTION, NON-ORAL TOPICAL
COMMUNITY
Start: 2024-10-01

## 2024-10-21 RX ORDER — CLINDAMYCIN PHOSPHATE 10 UG/ML
LOTION TOPICAL 3 TIMES DAILY
COMMUNITY
Start: 2024-09-30

## 2024-10-28 ENCOUNTER — OFFICE VISIT (OUTPATIENT)
Dept: ENDOCRINOLOGY | Facility: CLINIC | Age: 44
End: 2024-10-28
Payer: COMMERCIAL

## 2024-10-28 VITALS
OXYGEN SATURATION: 94 % | HEIGHT: 68 IN | DIASTOLIC BLOOD PRESSURE: 72 MMHG | SYSTOLIC BLOOD PRESSURE: 120 MMHG | HEART RATE: 74 BPM | WEIGHT: 191 LBS | BODY MASS INDEX: 28.95 KG/M2

## 2024-10-28 DIAGNOSIS — E10.65 TYPE 1 DIABETES MELLITUS WITH HYPERGLYCEMIA: Primary | ICD-10-CM

## 2024-10-28 DIAGNOSIS — E11.42 DIABETIC PERIPHERAL NEUROPATHY: ICD-10-CM

## 2024-10-28 PROCEDURE — 99214 OFFICE O/P EST MOD 30 MIN: CPT | Performed by: INTERNAL MEDICINE

## 2024-10-28 PROCEDURE — 95251 CONT GLUC MNTR ANALYSIS I&R: CPT | Performed by: INTERNAL MEDICINE

## 2024-10-28 RX ORDER — INSULIN LISPRO 100 [IU]/ML
INJECTION, SOLUTION INTRAVENOUS; SUBCUTANEOUS
Qty: 90 ML | Refills: 3 | Status: SHIPPED | OUTPATIENT
Start: 2024-10-28

## 2024-10-28 NOTE — PATIENT INSTRUCTIONS
Please stop smoking  Continue current medications otherwise  Please do not take insulin in between meals as a bolus insulin.  If you forget to take insulin at a meal just catch up with the next meal.  Always keep glucose source in case of low blood sugar  Labs before [place].

## 2024-10-28 NOTE — PROGRESS NOTES
Endocrine Progress Note Outpatient     Patient Care Team:  Provider, No Known as PCP - General    Chief Complaint: Follow-up type 1 diabetes    HPI: 44-year-old male with history of type 1 diabetes for last 16 years is here for follow-up    He is currently using Tandem insulin pump with Dexcom since last 2/2022. He likes this system, now using Control IQ. His current insulin pump settings are as follows.  Basal rate: Midnight 0.85 units/h. His insulin carb ratio is 1 unit for each 10 g of carbohydrate.  His insulin correction factor at midnight is 1 unit for each 40 mg blood sugars and at noon is 1 unit for each 30 mg blood sugars.  Active insulin is 5 hours with a target blood sugar of 110. He uses Humalog insulin in the pump.      He has not been hospitalized since last seen for either low or high blood sugar and has not required any assistance or hospitalization.    Diabetic neuropathy : On Vit D and B12     Past Medical History:   Diagnosis Date    Diabetes mellitus     Type 1 (14 years)    Diabetes mellitus type I     Numbness of arm        Social History     Socioeconomic History    Marital status:      Spouse name: Rosario    Number of children: 3    Years of education: 12   Tobacco Use    Smoking status: Every Day     Current packs/day: 1.00     Average packs/day: 1 pack/day for 22.0 years (22.0 ttl pk-yrs)     Types: Cigarettes    Smokeless tobacco: Never   Vaping Use    Vaping status: Former    Substances: Flavoring    Devices: Disposable   Substance and Sexual Activity    Alcohol use: Yes     Alcohol/week: 2.0 standard drinks of alcohol     Types: 1 Cans of beer, 1 Shots of liquor per week     Comment: Weekends 5-6 beers, tequilla, whiskey    Drug use: Yes     Types: Marijuana     Comment: Daily    Sexual activity: Yes     Partners: Female     Birth control/protection: None       Family History   Problem Relation Age of Onset    Diabetes Son         Type 1        No Known Allergies    ROS:    Constitutional:  Denies fatigue, tiredness.    Eyes:  Denies change in visual acuity   HENT:  Denies nasal congestion or sore throat   Respiratory: denies cough, shortness of breath.   Cardiovascular:  denies chest pain, edema   GI:  Denies abdominal pain, nausea, vomiting.   Musculoskeletal:  Denies back pain or joint pain   Integument:  Denies dry skin and rash   Neurologic:  Denies headache, focal weakness or sensory changes   Endocrine:  Denies polyuria or polydipsia   Psychiatric:  Denies depression or anxiety      Vitals:    10/28/24 1508   BP: 120/72   Pulse: 74   SpO2: 94%     Body mass index is 29.04 kg/m².     Physical Exam:  GEN: NAD, conversant  EYES: EOMI  NECK: no thyromegaly,  LUNG: CTA  PSYCH: AOX3, appropriate mood, affect normal      Results Review:     I reviewed the patient's new clinical results.    Lab Results   Component Value Date    HGBA1C 8.45 (H) 10/16/2024    HGBA1C 8.00 (H) 04/16/2024    HGBA1C 7.90 (H) 07/25/2023      Lab Results   Component Value Date    GLUCOSE 77 10/16/2024    BUN 15 10/16/2024    CREATININE 1.16 10/16/2024    EGFRIFNONA 80 11/22/2021    BCR 12.9 10/16/2024    K 4.7 10/16/2024    CO2 28.0 10/16/2024    CALCIUM 9.9 10/16/2024    ALBUMIN 4.4 10/16/2024    LABIL2 1.7 09/17/2019    AST 24 10/16/2024    ALT 16 10/16/2024    CHOL 180 10/16/2024    TRIG 73 10/16/2024     (H) 10/16/2024    HDL 64 (H) 10/16/2024     Lab Results   Component Value Date    TSH 2.270 10/16/2024    FREET4 1.16 09/17/2019     Dexcom download interpretation: Dates covered was between October 15, 2024 to October 28, 2024.  Average blood sugar is 164.  Spending 61% in the range, about 3% below range and about 36% above range.  Glucose graph does show some postprandial hyperglycemia.    Medication Review: Reviewed.       Current Outpatient Medications:     clindamycin (CLEOCIN T) 1 % lotion, Apply  topically to the appropriate area as directed 3 (Three) Times a Day. apply topically to the  affected area three times daily, Disp: , Rfl:     Continuous Glucose  (Dexcom G7 ) device, Use 1 Device Daily., Disp: 1 each, Rfl: 0    Continuous Glucose Sensor (Dexcom G7 Sensor) misc, Use 1 each Daily., Disp: 9 each, Rfl: 3    Cyanocobalamin (Vitamin B-12) 1000 MCG sublingual tablet, Place 1 tablet under the tongue Daily., Disp: 100 each, Rfl: 4    Drysol 20 % external solution, APPLY TOPICALLY TO THE SKIN DAILY AS DIRECTED, Disp: , Rfl:     HumaLOG 100 UNIT/ML injection, USE IN INSULIN PUMP AS DIRECTED. MAXIMUM DAILY DOSE: 100 UNITS  (DISCARD VIAL 28 DAYS AFTER  OPENING), Disp: 90 mL, Rfl: 3    insulin patient supplied pump, Inject  under the skin into the appropriate area as directed Continuous. Novolog insulin- per patient ratio of 10:1, Disp: , Rfl:     Vitamin D, Ergocalciferol, 50 MCG (2000 UT) capsule, Take 2,000 Units by mouth Daily., Disp: 100 capsule, Rfl: 4      Assessment and plan:  Diabetes mellitus type 1 with hyperglycemia: Uncontrolled with worsening of A1c at 8.4%.  Unfortunately has been forgetting to take his insulin sometimes and then if he has been trying to catch up by taking the insulin after he is eating.  I have discouraged him from doing that.  I advised him to try not to take his insulin in between meals as a bolus.  And will follow blood sugars and A1c and I will keep him at his current settings for now.  Advised to always keep glucose source in case of low blood sugars and continue to work on diet and activity.    Diabetic peripheral neuropathy: Currently on vitamin D supplementation.               Skylar Barnes MD FACE.

## 2024-12-03 NOTE — TELEPHONE ENCOUNTER
Caller: ZaidagiovaniBilly    Relationship: Self    Best call back number: [phone]    Requested Prescriptions:   Requested Prescriptions     Pending Prescriptions Disp Refills    insulin patient supplied pump       Sig: Inject  under the skin into the appropriate area as directed Continuous. Novolog insulin- per patient ratio of 10:1        Pharmacy where request should be sent: [facility] [place] [id number] [address] [phone] [facility] [phone]      Last office visit with prescribing clinician: 10/28/2024   Last telemedicine visit with prescribing clinician: Visit date not found   Next office visit with prescribing clinician: 6/10/2025     Additional details provided by patient: PT HAS 1 SET LEFT W A 5 DAY SUPPLY, BUT RX IS GOING THROUGH MAIL ORDER. PLEASE SEND IN ASAP     Does the patient have less than a 3 day supply:  [] Yes  [x] No    Would you like a call back once the refill request has been completed: [] Yes [] No    If the office needs to give you a call back, can they leave a voicemail: [] Yes [] No    Leny Leblanc Rep   12/03/24 13:43 EST

## 2024-12-11 ENCOUNTER — TELEPHONE (OUTPATIENT)
Dept: ENDOCRINOLOGY | Facility: CLINIC | Age: 44
End: 2024-12-11
Payer: COMMERCIAL

## 2024-12-11 NOTE — TELEPHONE ENCOUNTER
He called and asked that we send over a RX for the T-Slim supplies to [facility] but they do not cover the pump supplies. He will have to call a DME company and get them to send over paper work and we can get his supplies for the T-Slim.     In his history he was using [facility] but [facility] stated that the account was canceled July 2024. We resent RX to [facility].

## 2024-12-12 DIAGNOSIS — E10.65 TYPE 1 DIABETES MELLITUS WITH HYPERGLYCEMIA: Primary | ICD-10-CM

## 2024-12-12 RX ORDER — INSULIN PUMP CARTRIDGE
1 CARTRIDGE (EA) SUBCUTANEOUS
Qty: 30 EACH | Refills: 3 | Status: SHIPPED | OUTPATIENT
Start: 2024-12-12

## 2024-12-12 RX ORDER — INSULIN INFUSION SET/CARTRIDGE
1 COMBINATION PACKAGE (EA) MISCELLANEOUS
Qty: 30 EACH | Refills: 3 | Status: SHIPPED | OUTPATIENT
Start: 2024-12-12

## 2024-12-12 NOTE — TELEPHONE ENCOUNTER
Pt called needing t slim supplies (infusion set and cartridge) sent to [facility]. Previous rx was on print. Pt changes every 3 days. Rx sent to provider for review and signature.

## 2025-01-07 ENCOUNTER — TELEPHONE (OUTPATIENT)
Dept: ENDOCRINOLOGY | Facility: CLINIC | Age: 45
End: 2025-01-07
Payer: COMMERCIAL

## 2025-01-07 NOTE — TELEPHONE ENCOUNTER
Caller: Billy Chaudhari    Relationship to patient: Self      Best call back number: [phone]    Provider: YANDEL MARTINEZ    Medication PA needed:     Reason for call/Prior Auth: PT IS NEEDING A PA FOR MEDICATION HE REQUESTED BACK ON DEC 12/12/2024. PT SAID A PA IS NEEDED FOR HIS T SLIM SUPPLIES THAT WAS SENT TO [facility]. PLEASE GIVE PT A CALL BACK ASAP. PT REALLY NEEDS HIS MEDICATION AND GET PA RESOLVED. IF NOT ABLE TO REACH PT. IT IS OKAY TO LVM.        [Negative] : Genitourinary

## 2025-01-09 NOTE — TELEPHONE ENCOUNTER
Sent email to [facility], waiting on pt to call back to verify insurance information.

## 2025-01-16 DIAGNOSIS — E10.65 TYPE 1 DIABETES MELLITUS WITH HYPERGLYCEMIA: ICD-10-CM

## 2025-01-16 RX ORDER — INSULIN PUMP CARTRIDGE
1 CARTRIDGE (EA) SUBCUTANEOUS
Qty: 30 EACH | Refills: 3 | Status: SHIPPED | OUTPATIENT
Start: 2025-01-16

## 2025-01-16 RX ORDER — INSULIN INFUSION SET/CARTRIDGE
1 COMBINATION PACKAGE (EA) MISCELLANEOUS
Qty: 30 EACH | Refills: 3 | Status: SHIPPED | OUTPATIENT
Start: 2025-01-16

## 2025-02-08 ENCOUNTER — HOSPITAL ENCOUNTER (EMERGENCY)
Facility: HOSPITAL | Age: 45
Discharge: HOME | End: 2025-02-08
Payer: COMMERCIAL

## 2025-02-08 VITALS
HEART RATE: 70 BPM | HEIGHT: 68 IN | RESPIRATION RATE: 18 BRPM | BODY MASS INDEX: 30.04 KG/M2 | TEMPERATURE: 97.9 F | WEIGHT: 198.19 LBS | OXYGEN SATURATION: 97 % | SYSTOLIC BLOOD PRESSURE: 115 MMHG | DIASTOLIC BLOOD PRESSURE: 82 MMHG

## 2025-02-08 DIAGNOSIS — M54.6 ACUTE RIGHT-SIDED THORACIC BACK PAIN: Primary | ICD-10-CM

## 2025-02-08 LAB
ALBUMIN SERPL-MCNC: 4.5 G/DL (ref 3.5–5.2)
ALBUMIN/GLOB SERPL: 1.4 G/DL
ALP SERPL-CCNC: 74 U/L (ref 39–117)
ALT SERPL W P-5'-P-CCNC: 21 U/L (ref 1–41)
ANION GAP SERPL CALCULATED.3IONS-SCNC: 9.3 MMOL/L (ref 5–15)
AST SERPL-CCNC: 19 U/L (ref 1–40)
BASOPHILS # BLD AUTO: 0.06 10*3/MM3 (ref 0–0.2)
BASOPHILS NFR BLD AUTO: 0.7 % (ref 0–1.5)
BILIRUB SERPL-MCNC: 0.4 MG/DL (ref 0–1.2)
BILIRUB UR QL STRIP: NEGATIVE
BUN SERPL-MCNC: 17 MG/DL (ref 6–20)
BUN/CREAT SERPL: 17.7 (ref 7–25)
CALCIUM SPEC-SCNC: 10 MG/DL (ref 8.6–10.5)
CHLORIDE SERPL-SCNC: 100 MMOL/L (ref 98–107)
CLARITY UR: CLEAR
CO2 SERPL-SCNC: 29.7 MMOL/L (ref 22–29)
COLOR UR: YELLOW
CREAT SERPL-MCNC: 0.96 MG/DL (ref 0.76–1.27)
DEPRECATED RDW RBC AUTO: 42.7 FL (ref 37–54)
EGFRCR SERPLBLD CKD-EPI 2021: 98.7 ML/MIN/1.73
EOSINOPHIL # BLD AUTO: 0.53 10*3/MM3 (ref 0–0.4)
EOSINOPHIL NFR BLD AUTO: 6 % (ref 0.3–6.2)
ERYTHROCYTE [DISTWIDTH] IN BLOOD BY AUTOMATED COUNT: 12.4 % (ref 12.3–15.4)
GLOBULIN UR ELPH-MCNC: 3.2 GM/DL
GLUCOSE SERPL-MCNC: 196 MG/DL (ref 65–99)
GLUCOSE UR STRIP-MCNC: ABNORMAL MG/DL
HCT VFR BLD AUTO: 49.3 % (ref 37.5–51)
HGB BLD-MCNC: 16.5 G/DL (ref 13–17.7)
HGB UR QL STRIP.AUTO: NEGATIVE
HOLD SPECIMEN: NORMAL
IMM GRANULOCYTES # BLD AUTO: 0.03 10*3/MM3 (ref 0–0.05)
IMM GRANULOCYTES NFR BLD AUTO: 0.3 % (ref 0–0.5)
KETONES UR QL STRIP: ABNORMAL
LEUKOCYTE ESTERASE UR QL STRIP.AUTO: NEGATIVE
LYMPHOCYTES # BLD AUTO: 3.04 10*3/MM3 (ref 0.7–3.1)
LYMPHOCYTES NFR BLD AUTO: 34.2 % (ref 19.6–45.3)
MCH RBC QN AUTO: 31.4 PG (ref 26.6–33)
MCHC RBC AUTO-ENTMCNC: 33.5 G/DL (ref 31.5–35.7)
MCV RBC AUTO: 93.7 FL (ref 79–97)
MONOCYTES # BLD AUTO: 0.66 10*3/MM3 (ref 0.1–0.9)
MONOCYTES NFR BLD AUTO: 7.4 % (ref 5–12)
NEUTROPHILS NFR BLD AUTO: 4.58 10*3/MM3 (ref 1.7–7)
NEUTROPHILS NFR BLD AUTO: 51.4 % (ref 42.7–76)
NITRITE UR QL STRIP: NEGATIVE
NRBC BLD AUTO-RTO: 0 /100 WBC (ref 0–0.2)
PH UR STRIP.AUTO: 5.5 [PH] (ref 5–8)
PLATELET # BLD AUTO: 214 10*3/MM3 (ref 140–450)
PMV BLD AUTO: 9.6 FL (ref 6–12)
POTASSIUM SERPL-SCNC: 4.6 MMOL/L (ref 3.5–5.2)
PROT SERPL-MCNC: 7.7 G/DL (ref 6–8.5)
PROT UR QL STRIP: ABNORMAL
RBC # BLD AUTO: 5.26 10*6/MM3 (ref 4.14–5.8)
SODIUM SERPL-SCNC: 139 MMOL/L (ref 136–145)
SP GR UR STRIP: 1.03 (ref 1–1.03)
UROBILINOGEN UR QL STRIP: ABNORMAL
WBC NRBC COR # BLD AUTO: 8.9 10*3/MM3 (ref 3.4–10.8)
WHOLE BLOOD HOLD COAG: NORMAL

## 2025-02-08 PROCEDURE — 25010000002 ONDANSETRON PER 1 MG: Performed by: PHYSICIAN ASSISTANT

## 2025-02-08 PROCEDURE — 25010000002 MORPHINE PER 10 MG: Performed by: PHYSICIAN ASSISTANT

## 2025-02-08 PROCEDURE — 25010000002 KETOROLAC TROMETHAMINE PER 15 MG: Performed by: PHYSICIAN ASSISTANT

## 2025-02-08 PROCEDURE — 80053 COMPREHEN METABOLIC PANEL: CPT | Performed by: PHYSICIAN ASSISTANT

## 2025-02-08 PROCEDURE — 96375 TX/PRO/DX INJ NEW DRUG ADDON: CPT

## 2025-02-08 PROCEDURE — 81003 URINALYSIS AUTO W/O SCOPE: CPT | Performed by: PHYSICIAN ASSISTANT

## 2025-02-08 PROCEDURE — 85025 COMPLETE CBC W/AUTO DIFF WBC: CPT | Performed by: PHYSICIAN ASSISTANT

## 2025-02-08 PROCEDURE — 99283 EMERGENCY DEPT VISIT LOW MDM: CPT

## 2025-02-08 PROCEDURE — 96374 THER/PROPH/DIAG INJ IV PUSH: CPT

## 2025-02-08 RX ORDER — ONDANSETRON 2 MG/ML
4 INJECTION INTRAMUSCULAR; INTRAVENOUS ONCE
Status: COMPLETED | OUTPATIENT
Start: 2025-02-08 | End: 2025-02-08

## 2025-02-08 RX ORDER — KETOROLAC TROMETHAMINE 30 MG/ML
30 INJECTION, SOLUTION INTRAMUSCULAR; INTRAVENOUS ONCE
Status: COMPLETED | OUTPATIENT
Start: 2025-02-08 | End: 2025-02-08

## 2025-02-08 RX ORDER — METHOCARBAMOL 500 MG/1
500 TABLET, FILM COATED ORAL 4 TIMES DAILY PRN
Qty: 40 TABLET | Refills: 0 | Status: SHIPPED | OUTPATIENT
Start: 2025-02-08 | End: 2025-02-18

## 2025-02-08 RX ORDER — HYDROCODONE BITARTRATE AND ACETAMINOPHEN 5; 325 MG/1; MG/1
1 TABLET ORAL EVERY 6 HOURS PRN
Qty: 12 TABLET | Refills: 0 | Status: SHIPPED | OUTPATIENT
Start: 2025-02-08

## 2025-02-08 RX ADMIN — KETOROLAC TROMETHAMINE 30 MG: 30 INJECTION, SOLUTION INTRAMUSCULAR at 12:02

## 2025-02-08 RX ADMIN — MORPHINE SULFATE 4 MG: 4 INJECTION, SOLUTION INTRAMUSCULAR; INTRAVENOUS at 12:02

## 2025-02-08 RX ADMIN — ONDANSETRON 4 MG: 2 INJECTION, SOLUTION INTRAMUSCULAR; INTRAVENOUS at 12:01

## 2025-02-08 NOTE — ED PROVIDER NOTES
Subjective   History of Present Illness  44-year-old male presents emergency room with complaints of right mid back pain that radiates into the right flank patient states he has had the symptoms for a week.  Denies any falls or heavy lifting that caused the back pain.  Patient denies any numbness or tingling to lower extremities denies any weakness of lower extremities.  Denies fecal urinary incontinence.  Does not have history is of having bad back in the past.  Has no history is of kidney stones there is no fevers no chills no nausea no vomiting.  No dysuria frequency urination or hematuria reported.        Review of Systems   Constitutional:  Negative for chills, fatigue and fever.   Gastrointestinal:  Negative for abdominal pain, diarrhea, nausea and vomiting.   Genitourinary:  Negative for dysuria, frequency, hematuria and testicular pain.   Musculoskeletal:  Positive for back pain. Negative for neck pain and neck stiffness.   Skin:  Negative for rash.       Past Medical History:   Diagnosis Date    Diabetes mellitus     Type 1 (14 years)    Diabetes mellitus type I     Numbness of arm        No Known Allergies    Past Surgical History:   Procedure Laterality Date    CYST REMOVAL      HERNIA REPAIR         Family History   Problem Relation Age of Onset    Diabetes Son         Type 1        Social History     Socioeconomic History    Marital status:      Spouse name: Rosario    Number of children: 3    Years of education: 12   Tobacco Use    Smoking status: Every Day     Current packs/day: 1.00     Average packs/day: 1 pack/day for 22.0 years (22.0 ttl pk-yrs)     Types: Cigarettes    Smokeless tobacco: Never   Vaping Use    Vaping status: Former    Substances: Flavoring    Devices: Disposable   Substance and Sexual Activity    Alcohol use: Yes     Alcohol/week: 2.0 standard drinks of alcohol     Types: 1 Cans of beer, 1 Shots of liquor per week     Comment: Weekends 5-6 beers, tequilla, whiskey    Drug use:  "Yes     Types: Marijuana     Comment: Daily    Sexual activity: Yes     Partners: Female     Birth control/protection: None           Objective   Physical Exam  Vitals and nursing note reviewed.   Constitutional:       General: He is not in acute distress.     Appearance: Normal appearance. He is not ill-appearing, toxic-appearing or diaphoretic.   Cardiovascular:      Rate and Rhythm: Normal rate and regular rhythm.   Pulmonary:      Effort: Pulmonary effort is normal.   Musculoskeletal:      Cervical back: Normal, normal range of motion and neck supple.      Thoracic back: Tenderness present. No bony tenderness. Normal range of motion.      Lumbar back: Tenderness present.      Comments: Diffuse tenderness to the right mid radiating down into the right lower back to palpation.  Muscle spasms noted.  No spinous process tenderness to palpation.  Patient's range of motion is decreased secondary to the pain.   Neurological:      Mental Status: He is alert.         Procedures           ED Course    /82   Pulse 70   Temp 97.9 °F (36.6 °C) (Oral)   Resp 18   Ht 172.7 cm (68\")   Wt 89.9 kg (198 lb 3.1 oz)   SpO2 97%   BMI 30.14 kg/m²   Labs Reviewed   COMPREHENSIVE METABOLIC PANEL - Abnormal; Notable for the following components:       Result Value    Glucose 196 (*)     CO2 29.7 (*)     All other components within normal limits    Narrative:     GFR Categories in Chronic Kidney Disease (CKD)      GFR Category          GFR (mL/min/1.73)    Interpretation  G1                     90 or greater         Normal or high (1)  G2                      60-89                Mild decrease (1)  G3a                   45-59                Mild to moderate decrease  G3b                   30-44                Moderate to severe decrease  G4                    15-29                Severe decrease  G5                    14 or less           Kidney failure          (1)In the absence of evidence of kidney disease, neither GFR " category G1 or G2 fulfill the criteria for CKD.    eGFR calculation 2021 CKD-EPI creatinine equation, which does not include race as a factor   URINALYSIS W/ MICROSCOPIC IF INDICATED (NO CULTURE) - Abnormal; Notable for the following components:    Specific Gravity, UA 1.034 (*)     Glucose, UA >=1000 mg/dL (3+) (*)     Ketones, UA Trace (*)     Protein, UA Trace (*)     All other components within normal limits    Narrative:     Urine microscopic not indicated.   CBC WITH AUTO DIFFERENTIAL - Abnormal; Notable for the following components:    Eosinophils, Absolute 0.53 (*)     All other components within normal limits   CBC AND DIFFERENTIAL    Narrative:     The following orders were created for panel order CBC & Differential.  Procedure                               Abnormality         Status                     ---------                               -----------         ------                     CBC Auto Differential[[id number]]        Abnormal            Final result                 Please view results for these tests on the individual orders.   EXTRA TUBES    Narrative:     The following orders were created for panel order Extra Tubes.  Procedure                               Abnormality         Status                     ---------                               -----------         ------                     Gold Top - SST[[id number]]                                   Final result               Light Blue Top[[id number]]                                   Final result                 Please view results for these tests on the individual orders.   GOLD TOP - SST   LIGHT BLUE TOP     Medications   morphine injection 4 mg (4 mg Intravenous Given 2/8/25 1202)   ketorolac (TORADOL) injection 30 mg (30 mg Intravenous Given 2/8/25 1202)   ondansetron (ZOFRAN) injection 4 mg (4 mg Intravenous Given 2/8/25 1201)     No radiology results for the last day                                                     Medical Decision  Making  44-year-old male presents emergency room with right mid back pain radiating to right flank for the past week.  Patient denies hematuria denies nausea vomiting denies fever chills denies falls or heavy lifting.  Denies fecal or urinary incontinence or numbness tingling lower extremities.  Patient denies any testicular pain.  Physical exam HEENT is normocephalic and nontraumatic lungs clear to auscultation bilaterally heart regular and without murmur abdomen soft nontender nondistended.  Normal bowel sound x 4 quad.  Back: Diffuse tenderness however is severely tender over the right flank and right thoracic spine with no spinous process tenderness.  There is no rash noted.  All movements do increase discomfort.  Vital signs: BP is 115/82 temperature 97.9 heart rate is 70 respirations 18 SpO2 room air is 97%.  ER course CBC: WBC is 8.9 hemoglobin 16.5 hematocrit is 49.3 with platelets of 214.  CMP glucose is 196 BUN 17 creatinine is 0.96 sodium of 139 potassium of 4.6.  Urinalysis shows glucose but patient is diabetic trace ketones but no blood.  Without abdominal tenderness unlikely to have gallbladder issues unlikely to have pyelonephritis with your normal urinalysis with the physical pain to palpation is most likely musculoskeletal in nature.  This was discussed with patient patient received Zofran for Toradol 30 and 4 of morphine for pain control patient at discharge states pain is much improved.  Patient was advised to continue heat ice and rotation massage and to follow-up with family doctor next week if no better.  Patient will return to the emergency room if any symptoms worsen.  Prescription for Norco No. 12 and methocarbamol No. 40 were sent to the pharmacy the patient was discharged home in stable condition after verbalizing and agreeing with treatment plan.    Problems Addressed:  Acute right-sided thoracic back pain: complicated acute illness or injury    Amount and/or Complexity of Data  Reviewed  Labs: ordered.    Risk  Prescription drug management.        Final diagnoses:   Acute right-sided thoracic back pain       ED Disposition  ED Disposition       ED Disposition   Discharge    Condition   Stable    Comment   --               [facility] EMERGENCY DEPARTMENT  [facility] [address] [phone]    If symptoms worsen         Medication List        New Prescriptions      HYDROcodone-acetaminophen 5-325 MG per tablet  Commonly known as: NORCO  Take 1 tablet by mouth Every 6 (Six) Hours As Needed for Moderate Pain.     methocarbamol 500 MG tablet  Commonly known as: ROBAXIN  Take 1 tablet by mouth 4 (Four) Times a Day As Needed for Muscle Spasms for up to 10 days.               Where to Get Your Medications        These medications were sent to [id number] - [place], [place] - [address] - [phone] PH - [phone] FX  [address] [place] [address]      Phone: [phone]   HYDROcodone-acetaminophen 5-325 MG per tablet  methocarbamol 500 MG tablet            Jason Ennis PA-C  02/08/25 4045

## 2025-02-28 DIAGNOSIS — E10.65 TYPE 1 DIABETES MELLITUS WITH HYPERGLYCEMIA: Primary | ICD-10-CM

## 2025-02-28 RX ORDER — ACYCLOVIR 400 MG/1
TABLET ORAL DAILY
Qty: 9 EACH | Refills: 3 | Status: SHIPPED | OUTPATIENT
Start: 2025-02-28

## 2025-05-05 RX ORDER — ACYCLOVIR 400 MG/1
TABLET ORAL DAILY
Qty: 1 EACH | Refills: 0 | Status: SHIPPED | OUTPATIENT
Start: 2025-05-05

## 2025-06-03 ENCOUNTER — LAB (OUTPATIENT)
Dept: ENDOCRINOLOGY | Facility: CLINIC | Age: 45
End: 2025-06-03
Payer: COMMERCIAL

## 2025-06-03 DIAGNOSIS — E10.65 TYPE 1 DIABETES MELLITUS WITH HYPERGLYCEMIA: ICD-10-CM

## 2025-06-04 LAB
ALBUMIN SERPL-MCNC: 4.2 G/DL (ref 4.1–5.1)
ALBUMIN/CREAT UR: 12 MG/G CREAT (ref 0–29)
ALP SERPL-CCNC: 72 IU/L (ref 44–121)
ALT SERPL-CCNC: 16 IU/L (ref 0–44)
AST SERPL-CCNC: 23 IU/L (ref 0–40)
BILIRUB SERPL-MCNC: 0.2 MG/DL (ref 0–1.2)
BUN SERPL-MCNC: 12 MG/DL (ref 6–24)
BUN/CREAT SERPL: 13 (ref 9–20)
CALCIUM SERPL-MCNC: 9 MG/DL (ref 8.7–10.2)
CHLORIDE SERPL-SCNC: 105 MMOL/L (ref 96–106)
CHOLEST SERPL-MCNC: 163 MG/DL (ref 100–199)
CO2 SERPL-SCNC: 22 MMOL/L (ref 20–29)
CREAT SERPL-MCNC: 0.95 MG/DL (ref 0.76–1.27)
CREAT UR-MCNC: 142.6 MG/DL
EGFRCR SERPLBLD CKD-EPI 2021: 100 ML/MIN/1.73
GLOBULIN SER CALC-MCNC: 2.1 G/DL (ref 1.5–4.5)
GLUCOSE SERPL-MCNC: 159 MG/DL (ref 70–99)
HBA1C MFR BLD: 8.2 % (ref 4.8–5.6)
HDLC SERPL-MCNC: 56 MG/DL
LDLC SERPL CALC-MCNC: 86 MG/DL (ref 0–99)
MICROALBUMIN UR-MCNC: 17 UG/ML
POTASSIUM SERPL-SCNC: 4.3 MMOL/L (ref 3.5–5.2)
PROT SERPL-MCNC: 6.3 G/DL (ref 6–8.5)
SODIUM SERPL-SCNC: 141 MMOL/L (ref 134–144)
TRIGL SERPL-MCNC: 117 MG/DL (ref 0–149)
VLDLC SERPL CALC-MCNC: 21 MG/DL (ref 5–40)

## 2025-06-10 ENCOUNTER — OFFICE VISIT (OUTPATIENT)
Dept: ENDOCRINOLOGY | Facility: CLINIC | Age: 45
End: 2025-06-10
Payer: COMMERCIAL

## 2025-06-10 VITALS
DIASTOLIC BLOOD PRESSURE: 72 MMHG | OXYGEN SATURATION: 96 % | BODY MASS INDEX: 30.77 KG/M2 | HEART RATE: 79 BPM | SYSTOLIC BLOOD PRESSURE: 120 MMHG | WEIGHT: 203 LBS | HEIGHT: 68 IN

## 2025-06-10 DIAGNOSIS — E10.65 TYPE 1 DIABETES MELLITUS WITH HYPERGLYCEMIA: Primary | ICD-10-CM

## 2025-06-10 DIAGNOSIS — E11.42 DIABETIC PERIPHERAL NEUROPATHY: ICD-10-CM

## 2025-06-10 RX ORDER — CLINDAMYCIN PHOSPHATE 10 UG/ML
LOTION TOPICAL
Qty: 15 ML | Refills: 5 | Status: SHIPPED | OUTPATIENT
Start: 2025-06-10

## 2025-06-10 NOTE — PATIENT INSTRUCTIONS
Please watch for low blood sugar since I made some changes on your basal rate.  Always keep glucose source in case of low blood sugar  Labs before follow-up.

## 2025-06-10 NOTE — PROGRESS NOTES
Endocrine Progress Note Outpatient     Patient Care Team:  Provider, No Known as PCP - General    Chief Complaint: Follow-up type 1 diabetes    HPI: 44-year-old male with history of type 1 diabetes for last 16 years is here for follow-up    He is currently using Tandem insulin pump with Dexcom since last 2/2022. He likes this system, now using [employer]. His current insulin pump settings are as follows.  Basal rate: Midnight 0.85 units/h. His insulin carb ratio is 1 unit for each 10 g of carbohydrate.  His insulin correction factor at midnight is 1 unit for each 40 mg blood sugars.  Active insulin is 5 hours with a target blood sugar of 110. He uses Humalog insulin in the pump.      He has not been hospitalized since last seen for either low or high blood sugar and has not required any assistance or hospitalization.    Diabetic neuropathy : On Vit D and B12     Past Medical History:   Diagnosis Date    Diabetes mellitus     Type 1 (14 years)    Diabetes mellitus type I     Numbness of arm        Social History     Socioeconomic History    Marital status:      Spouse name: Rosario    Number of children: 3    Years of education: 12   Tobacco Use    Smoking status: Every Day     Current packs/day: 1.00     Average packs/day: 1 pack/day for 22.0 years (22.0 ttl pk-yrs)     Types: Cigarettes    Smokeless tobacco: Never   Vaping Use    Vaping status: Former    Substances: Flavoring    Devices: Disposable   Substance and Sexual Activity    Alcohol use: Yes     Alcohol/week: 2.0 standard drinks of alcohol     Types: 1 Cans of beer, 1 Shots of liquor per week     Comment: Weekends 5-6 beers, tequilla, whiskey    Drug use: Yes     Types: Marijuana     Comment: Daily    Sexual activity: Yes     Partners: Female     Birth control/protection: None       Family History   Problem Relation Age of Onset    Diabetes Son         Type 1        No Known Allergies    ROS:   Constitutional:  Denies fatigue, tiredness.    Eyes:   Denies change in visual acuity   HENT:  Denies nasal congestion or sore throat   Respiratory: denies cough, shortness of breath.   Cardiovascular:  denies chest pain, edema   GI:  Denies abdominal pain, nausea, vomiting.   Musculoskeletal:  Denies back pain or joint pain   Integument:  Denies dry skin and rash   Neurologic:  Denies headache, focal weakness or sensory changes   Endocrine:  Denies polyuria or polydipsia   Psychiatric:  Denies depression or anxiety      Vitals:    06/10/25 1444   BP: 120/72   Pulse: 79   SpO2: 96%     Body mass index is 30.87 kg/m².     Physical Exam:  GEN: NAD, conversant  EYES: EOMI  NECK: no thyromegaly,  LUNG: CTA  PSYCH: AOX3, appropriate mood, affect normal      Results Review:     I reviewed the patient's new clinical results.    Lab Results   Component Value Date    HGBA1C 8.2 (H) 06/03/2025    HGBA1C 8.45 (H) 10/16/2024    HGBA1C 8.00 (H) 04/16/2024      Lab Results   Component Value Date    GLUCOSE 159 (H) 06/03/2025    BUN 12 06/03/2025    CREATININE 0.95 06/03/2025    EGFRIFNONA 80 11/22/2021    BCR 13 06/03/2025    K 4.3 06/03/2025    CO2 22 06/03/2025    CALCIUM 9.0 06/03/2025    ALBUMIN 4.2 06/03/2025    LABIL2 1.7 09/17/2019    AST 23 06/03/2025    ALT 16 06/03/2025    CHOL 180 10/16/2024    TRIG 117 06/03/2025    LDL 86 06/03/2025    HDL 56 06/03/2025     Lab Results   Component Value Date    TSH 2.270 10/16/2024    FREET4 1.16 09/17/2019     Dexcom download interpretation: Dates cover was between May 14, 2025 to Candi 10, 2025.  Average blood sugar is 174.  Spending about 55% in the range and 39% above range and about 2% below range.  Glucose graph does show some fluctuations.      Medication Review: Reviewed.       Current Outpatient Medications:     clindamycin (CLEOCIN T) 1 % lotion, Apply  topically to the appropriate area as directed 3 (Three) Times a Day. apply topically to the affected area three times daily, Disp: , Rfl:     Continuous Glucose  (Dexcom  G7 ) device, USE DAILY, Disp: 1 each, Rfl: 0    Continuous Glucose Sensor (Dexcom G7 Sensor) misc, USE DAILY, Disp: 9 each, Rfl: 3    Cyanocobalamin (Vitamin B-12) 1000 MCG sublingual tablet, Place 1 tablet under the tongue Daily., Disp: 100 each, Rfl: 4    Drysol 20 % external solution, APPLY TOPICALLY TO THE SKIN DAILY AS DIRECTED, Disp: , Rfl:     HYDROcodone-acetaminophen (NORCO) 5-325 MG per tablet, Take 1 tablet by mouth Every 6 (Six) Hours As Needed for Moderate Pain., Disp: 12 tablet, Rfl: 0    Insulin Infusion Pump Supplies (AutoSoft XC Infusion Set) misc, Use 1 each Every 3 (Three) Days. Change infusion and cartridge every 3 days., Disp: 30 each, Rfl: 3    Insulin Infusion Pump Supplies (T:slim X2 3mL Cartridge) misc, Use 1 each Every 3 (Three) Days. Change infusion and cartridge every 3 days, Disp: 30 each, Rfl: 3    Insulin Lispro (HumaLOG) 100 UNIT/ML injection, For use insulin pump as directed.  Max daily dose is 100 units/day., Disp: 90 mL, Rfl: 3    insulin patient supplied pump, Inject 1 Units under the skin into the appropriate area as directed Continuous. Novolog insulin- per patient ratio of 10:1, Disp: 100 mL, Rfl: 3    Vitamin D, Ergocalciferol, 50 MCG (2000 UT) capsule, Take 2,000 Units by mouth Daily., Disp: 100 capsule, Rfl: 4      Assessment and plan:  Diabetes mellitus type 1 with hyperglycemia: Uncontrolled but improving with A1c now at 8.2%.  I have reviewed his Dexcom, I have changed his basal rate from midnight to midnight at 0.90 units/h.  Rest of the pump settings will stay as it is.  He is advised to watch for low blood sugars and always keep glucose source in case of low blood sugars.  Verbalized understanding.      Diabetic peripheral neuropathy: Currently on vitamin D supplementation.    Assessment and plan from October 18, 2024 reviewed and updated.           Skylar Barnes MD FACE.